# Patient Record
Sex: MALE | Race: WHITE | Employment: FULL TIME | URBAN - METROPOLITAN AREA
[De-identification: names, ages, dates, MRNs, and addresses within clinical notes are randomized per-mention and may not be internally consistent; named-entity substitution may affect disease eponyms.]

---

## 2017-06-12 RX ORDER — LISINOPRIL 10 MG/1
10 TABLET ORAL DAILY
Qty: 90 TABLET | Refills: 2 | Status: SHIPPED | OUTPATIENT
Start: 2017-06-12 | End: 2018-05-03 | Stop reason: SDUPTHER

## 2017-06-12 RX ORDER — ATORVASTATIN CALCIUM 40 MG/1
TABLET, FILM COATED ORAL
Qty: 90 TABLET | Refills: 2 | Status: ON HOLD | OUTPATIENT
Start: 2017-06-12 | End: 2022-04-30 | Stop reason: HOSPADM

## 2017-06-12 RX ORDER — CLOPIDOGREL BISULFATE 75 MG/1
75 TABLET ORAL DAILY
Qty: 90 TABLET | Refills: 2 | Status: SHIPPED | OUTPATIENT
Start: 2017-06-12 | End: 2018-07-09 | Stop reason: SDUPTHER

## 2018-03-20 RX ORDER — LISINOPRIL 10 MG/1
10 TABLET ORAL DAILY
Qty: 90 TABLET | Refills: 2 | OUTPATIENT
Start: 2018-03-20

## 2018-03-20 RX ORDER — ATORVASTATIN CALCIUM 40 MG/1
TABLET, FILM COATED ORAL
Qty: 90 TABLET | Refills: 2 | OUTPATIENT
Start: 2018-03-20

## 2018-03-20 RX ORDER — CLOPIDOGREL BISULFATE 75 MG/1
75 TABLET ORAL DAILY
Qty: 90 TABLET | Refills: 2 | OUTPATIENT
Start: 2018-03-20

## 2018-04-17 ENCOUNTER — TELEPHONE (OUTPATIENT)
Dept: CARDIOLOGY CLINIC | Age: 48
End: 2018-04-17

## 2018-05-03 ENCOUNTER — OFFICE VISIT (OUTPATIENT)
Dept: CARDIOLOGY CLINIC | Age: 48
End: 2018-05-03

## 2018-05-03 VITALS
DIASTOLIC BLOOD PRESSURE: 100 MMHG | HEIGHT: 70 IN | WEIGHT: 315 LBS | HEART RATE: 98 BPM | BODY MASS INDEX: 45.1 KG/M2 | OXYGEN SATURATION: 97 % | SYSTOLIC BLOOD PRESSURE: 138 MMHG

## 2018-05-03 DIAGNOSIS — I25.10 CORONARY ARTERY DISEASE INVOLVING NATIVE CORONARY ARTERY OF NATIVE HEART WITHOUT ANGINA PECTORIS: Primary | ICD-10-CM

## 2018-05-03 DIAGNOSIS — I10 ESSENTIAL HYPERTENSION: ICD-10-CM

## 2018-05-03 DIAGNOSIS — Z95.1 S/P CABG (CORONARY ARTERY BYPASS GRAFT): ICD-10-CM

## 2018-05-03 DIAGNOSIS — Z01.810 PREOP CARDIOVASCULAR EXAM: ICD-10-CM

## 2018-05-03 DIAGNOSIS — E78.00 PURE HYPERCHOLESTEROLEMIA: ICD-10-CM

## 2018-05-03 PROCEDURE — 99215 OFFICE O/P EST HI 40 MIN: CPT | Performed by: INTERNAL MEDICINE

## 2018-05-03 RX ORDER — LISINOPRIL 20 MG/1
20 TABLET ORAL DAILY
Qty: 90 TABLET | Refills: 3 | Status: ON HOLD
Start: 2018-05-03 | End: 2022-04-30 | Stop reason: SDUPTHER

## 2018-05-03 ASSESSMENT — ENCOUNTER SYMPTOMS
COUGH: 0
ABDOMINAL DISTENTION: 0
BLOOD IN STOOL: 0
EYE REDNESS: 0
SHORTNESS OF BREATH: 0
WHEEZING: 0

## 2018-06-02 PROBLEM — Z01.810 PREOP CARDIOVASCULAR EXAM: Status: RESOLVED | Noted: 2018-05-03 | Resolved: 2018-06-02

## 2018-07-09 RX ORDER — CLOPIDOGREL BISULFATE 75 MG/1
75 TABLET ORAL DAILY
Qty: 90 TABLET | Refills: 2 | Status: SHIPPED | OUTPATIENT
Start: 2018-07-09 | End: 2019-04-08 | Stop reason: SDUPTHER

## 2019-04-09 RX ORDER — CLOPIDOGREL BISULFATE 75 MG/1
75 TABLET ORAL DAILY
Qty: 90 TABLET | Refills: 1 | Status: ON HOLD | OUTPATIENT
Start: 2019-04-09 | End: 2022-04-30 | Stop reason: SDUPTHER

## 2022-01-01 ENCOUNTER — HOSPITAL ENCOUNTER (EMERGENCY)
Age: 52
Discharge: HOME | End: 2022-01-02
Attending: STUDENT IN AN ORGANIZED HEALTH CARE EDUCATION/TRAINING PROGRAM
Payer: COMMERCIAL

## 2022-01-01 ENCOUNTER — APPOINTMENT (OUTPATIENT)
Dept: GENERAL RADIOLOGY | Age: 52
End: 2022-01-01
Payer: COMMERCIAL

## 2022-01-01 VITALS
HEIGHT: 70 IN | RESPIRATION RATE: 15 BRPM | TEMPERATURE: 97.4 F | OXYGEN SATURATION: 97 % | WEIGHT: 315 LBS | BODY MASS INDEX: 45.1 KG/M2 | HEART RATE: 77 BPM | DIASTOLIC BLOOD PRESSURE: 96 MMHG | SYSTOLIC BLOOD PRESSURE: 166 MMHG

## 2022-01-01 DIAGNOSIS — J06.9 VIRAL URI: ICD-10-CM

## 2022-01-01 DIAGNOSIS — Z20.822 LAB TEST NEGATIVE FOR COVID-19 VIRUS: Primary | ICD-10-CM

## 2022-01-01 LAB
BASOPHILS ABSOLUTE: 0.1 K/UL (ref 0–0.2)
BASOPHILS RELATIVE PERCENT: 1.9 %
EOSINOPHILS ABSOLUTE: 0.4 K/UL (ref 0–0.6)
EOSINOPHILS RELATIVE PERCENT: 6.5 %
HCT VFR BLD CALC: 45.6 % (ref 40.5–52.5)
HEMOGLOBIN: 15.1 G/DL (ref 13.5–17.5)
INFLUENZA A: NOT DETECTED
INFLUENZA B: NOT DETECTED
LYMPHOCYTES ABSOLUTE: 2 K/UL (ref 1–5.1)
LYMPHOCYTES RELATIVE PERCENT: 31.8 %
MCH RBC QN AUTO: 29.9 PG (ref 26–34)
MCHC RBC AUTO-ENTMCNC: 33.1 G/DL (ref 31–36)
MCV RBC AUTO: 90.3 FL (ref 80–100)
MONOCYTES ABSOLUTE: 0.6 K/UL (ref 0–1.3)
MONOCYTES RELATIVE PERCENT: 8.8 %
NEUTROPHILS ABSOLUTE: 3.3 K/UL (ref 1.7–7.7)
NEUTROPHILS RELATIVE PERCENT: 51 %
PDW BLD-RTO: 14.8 % (ref 12.4–15.4)
PLATELET # BLD: 208 K/UL (ref 135–450)
PMV BLD AUTO: 8.4 FL (ref 5–10.5)
RBC # BLD: 5.05 M/UL (ref 4.2–5.9)
SARS-COV-2 RNA, RT PCR: NOT DETECTED
WBC # BLD: 6.4 K/UL (ref 4–11)

## 2022-01-01 PROCEDURE — 36415 COLL VENOUS BLD VENIPUNCTURE: CPT

## 2022-01-01 PROCEDURE — 85025 COMPLETE CBC W/AUTO DIFF WBC: CPT

## 2022-01-01 PROCEDURE — 87636 SARSCOV2 & INF A&B AMP PRB: CPT

## 2022-01-01 PROCEDURE — 84484 ASSAY OF TROPONIN QUANT: CPT

## 2022-01-01 PROCEDURE — 93005 ELECTROCARDIOGRAM TRACING: CPT | Performed by: STUDENT IN AN ORGANIZED HEALTH CARE EDUCATION/TRAINING PROGRAM

## 2022-01-01 PROCEDURE — 71045 X-RAY EXAM CHEST 1 VIEW: CPT

## 2022-01-01 PROCEDURE — 80053 COMPREHEN METABOLIC PANEL: CPT

## 2022-01-01 PROCEDURE — 99282 EMERGENCY DEPT VISIT SF MDM: CPT

## 2022-01-01 ASSESSMENT — PAIN SCALES - GENERAL: PAINLEVEL_OUTOF10: 2

## 2022-01-01 ASSESSMENT — PAIN DESCRIPTION - LOCATION: LOCATION: GENERALIZED

## 2022-01-01 ASSESSMENT — PAIN DESCRIPTION - DESCRIPTORS: DESCRIPTORS: ACHING

## 2022-01-02 LAB
A/G RATIO: 1.3 (ref 1.1–2.2)
ALBUMIN SERPL-MCNC: 3.9 G/DL (ref 3.4–5)
ALP BLD-CCNC: 147 U/L (ref 40–129)
ALT SERPL-CCNC: 28 U/L (ref 10–40)
ANION GAP SERPL CALCULATED.3IONS-SCNC: 12 MMOL/L (ref 3–16)
AST SERPL-CCNC: 23 U/L (ref 15–37)
BILIRUB SERPL-MCNC: 0.3 MG/DL (ref 0–1)
BUN BLDV-MCNC: 12 MG/DL (ref 7–20)
CALCIUM SERPL-MCNC: 9.1 MG/DL (ref 8.3–10.6)
CHLORIDE BLD-SCNC: 103 MMOL/L (ref 99–110)
CO2: 23 MMOL/L (ref 21–32)
CREAT SERPL-MCNC: 0.8 MG/DL (ref 0.9–1.3)
EKG ATRIAL RATE: 71 BPM
EKG DIAGNOSIS: NORMAL
EKG P AXIS: 25 DEGREES
EKG P-R INTERVAL: 168 MS
EKG Q-T INTERVAL: 392 MS
EKG QRS DURATION: 100 MS
EKG QTC CALCULATION (BAZETT): 425 MS
EKG R AXIS: -51 DEGREES
EKG T AXIS: 98 DEGREES
EKG VENTRICULAR RATE: 71 BPM
GFR AFRICAN AMERICAN: >60
GFR NON-AFRICAN AMERICAN: >60
GLUCOSE BLD-MCNC: 142 MG/DL (ref 70–99)
POTASSIUM REFLEX MAGNESIUM: 4 MMOL/L (ref 3.5–5.1)
SODIUM BLD-SCNC: 138 MMOL/L (ref 136–145)
TOTAL PROTEIN: 6.9 G/DL (ref 6.4–8.2)
TROPONIN: <0.01 NG/ML

## 2022-01-02 PROCEDURE — 93010 ELECTROCARDIOGRAM REPORT: CPT | Performed by: INTERNAL MEDICINE

## 2022-01-02 NOTE — ED PROVIDER NOTES
[address] ED      CHIEF COMPLAINT  Concern For COVID-19 (Patient states that he has had nasal congestion, chills and loss of taste a few days ago)     HISTORY OF PRESENT ILLNESS  Sahara Cantor is a 49 y.o. male  who presents to the ED complaining of nasal and chest congestion, COVID exposure, chills, and burning/achy chest pain. 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 About a week ago he started to develop symptoms as described above which seemed to worsen about 3 days ago. He is concerned to go back to school on Monday as he wants to be sure he will not be potentially exposing any students to [place]. Also states he is frustrated that whenever he goes anywhere only a few people are wearing masks. He did receive 2 doses of Moderna vaccine. He denies measured fevers. He also complains of achy/burning chest pain in the center and left sides of his chest for the past few days which he believes are likely secondary to chest congestion however does state he had a stent in the past and would like to be sure it is not his heart. He denies any shortness of breath. Has a remote history of provoked DVT/PE after surgery and states this does not feel at all similar. He denies leg pain or swelling. He denies pleuritic chest pain. Denies cough or sputum production. Denies other complaints or concerns. No other complaints, modifying factors or associated symptoms. I have reviewed the following from the nursing documentation. Past Medical History:   Diagnosis Date    CAD (coronary artery disease)     stent 2006 & 2008, balloon angioplasty in 2010, S/P NSTEMI 8/19/14 post CABG-->Cath patent grafts, s/p LCx ADELIA.     H/O coronary artery bypass surgery     s/p CABG X2 LIMA-LAD, SVG-OM.  Echo 8/2014 LVEF 50-55%    History of tobacco use     quit smoking     HLD (hyperlipidemia)     Hypertension     Controlled    PE (pulmonary embolism)     and DVT history     Past Surgical History:   Procedure Laterality Date    CORONARY ARTERY BYPASS GRAFT  8-    CABG x 2     Family History   Problem Relation Age of Onset    Heart Disease Father      Social History     Socioeconomic History    Marital status:      Spouse name: Not on file    Number of children: Not on file    Years of education: Not on file    Highest education level: Not on file   Occupational History    Not on file   Tobacco Use    Smoking status: Former Smoker     Packs/day: 1.50     Years: 15.00     Pack years: 22.50     Types: Cigarettes     Quit date: 3/20/2006     Years since quitting: 15.8    Smokeless tobacco: Never Used   Vaping Use    Vaping Use: Never used   Substance and Sexual Activity    Alcohol use: No    Drug use: Not on file    Sexual activity: Not on file   Other Topics Concern    Not on file   Social History Narrative    Not on file     Social Determinants of Health     Financial Resource Strain:     Difficulty of Paying Living Expenses: Not on file   Food Insecurity:     Worried About [employer] in the Last Year: Not on file    Vee of Food in the Last Year: Not on file   Transportation Needs:     Lack of Transportation (Medical): Not on file    Lack of Transportation (Non-Medical):  Not on file   Physical Activity:     Days of Exercise per Week: Not on file    Minutes of Exercise per Session: Not on file   Stress:     Feeling of Stress : Not on file   Social Connections:     Frequency of Communication with Friends and Family: Not on file    Frequency of Social Gatherings with Friends and Family: Not on file    Attends Religion Services: Not on file    Active Member of Clubs or Organizations: Not on file    Attends Club or Organization Meetings: Not on file    Marital Status: Not on file   Intimate Partner Violence:     Fear of Current or Ex-Partner: Not on file    Emotionally Abused: Not on file    Physically Abused: Not on file    Sexually Abused: Not on file   Housing Stability:     Unable to Pay for Housing in the Last Year: Not on file    Number of Places Lived in the Last Year: Not on file    Unstable Housing in the Last Year: Not on file     No current facility-administered medications for this encounter. Current Outpatient Medications   Medication Sig Dispense Refill    clopidogrel (PLAVIX) 75 MG tablet TAKE 1 TABLET BY MOUTH DAILY 90 tablet 1    lisinopril (PRINIVIL;ZESTRIL) 20 MG tablet Take 1 tablet by mouth daily 90 tablet 3    atorvastatin (LIPITOR) 40 MG tablet TAKE 1 TABLET BY MOUTH NIGHTLY. 90 tablet 2    metoprolol tartrate (LOPRESSOR) 25 MG tablet Take 1 tablet by mouth 2 times daily 180 tablet 2    FLUoxetine (PROZAC) 10 MG capsule Take 10 mg by mouth daily      clonazePAM (KLONOPIN) 0.5 MG tablet Take 0.5 mg by mouth 2 times daily      Esomeprazole Magnesium (NEXIUM 24HR PO) Take 1 tablet by mouth daily      aspirin 81 MG chewable tablet Take 1 tablet by mouth daily. 30 tablet 3     No Known Allergies    REVIEW OF SYSTEMS  10 systems reviewed, pertinent positives per HPI otherwise noted to be negative. PHYSICAL EXAM  BP (!) 166/96   Pulse 77   Temp 97.4 °F (36.3 °C) (Temporal)   Resp 15   Ht 5' 10\" (1.778 m)   Wt (!) 320 lb (145.2 kg)   SpO2 97%   BMI 45.92 kg/m²    GENERAL APPEARANCE: Awake and alert. Cooperative. No acute distress. HENT: Normocephalic. Atraumatic. Mucous membranes are moist. No tonsillar swelling or exudates. No posterior pharyngeal erythema. No uvular deviation. NECK: Supple. EYES: PERRL. EOM's grossly intact. HEART/CHEST: RRR. No murmurs. LUNGS: Respirations unlabored. CTAB. Good air exchange. Speaking comfortably in full sentences. ABDOMEN: No tenderness. Soft. Non-distended. No masses. No organomegaly. No guarding or rebound. MUSCULOSKELETAL: No extremity edema. Compartments soft. No deformity. No tenderness in the extremities.   All extremities neurovascularly intact. SKIN: Warm and dry. No acute rashes. NEUROLOGICAL: Alert and oriented. CN's 2-12 intact. No gross facial drooping. Strength 5/5, sensation intact. Gait normal.  PSYCHIATRIC: Normal mood and affect. LABS  I have reviewed all labs for this visit.    Results for orders placed or performed during the hospital encounter of 01/01/22   COVID-19 & Influenza Combo    Specimen: Nasopharyngeal Swab   Result Value Ref Range    SARS-CoV-2 RNA, RT PCR NOT DETECTED NOT DETECTED    INFLUENZA A NOT DETECTED NOT DETECTED    INFLUENZA B NOT DETECTED NOT DETECTED   Comprehensive Metabolic Panel w/ Reflex to MG   Result Value Ref Range    Sodium 138 136 - 145 mmol/L    Potassium reflex Magnesium 4.0 3.5 - 5.1 mmol/L    Chloride 103 99 - 110 mmol/L    CO2 23 21 - 32 mmol/L    Anion Gap 12 3 - 16    Glucose 142 (H) 70 - 99 mg/dL    BUN 12 7 - 20 mg/dL    CREATININE 0.8 (L) 0.9 - 1.3 mg/dL    GFR Non-African American >60 >60    GFR African American >60 >60    Calcium 9.1 8.3 - 10.6 mg/dL    Total Protein 6.9 6.4 - 8.2 g/dL    Albumin 3.9 3.4 - 5.0 g/dL    Albumin/Globulin Ratio 1.3 1.1 - 2.2    Total Bilirubin 0.3 0.0 - 1.0 mg/dL    Alkaline Phosphatase 147 (H) 40 - 129 U/L    ALT 28 10 - 40 U/L    AST 23 15 - 37 U/L   Troponin   Result Value Ref Range    Troponin <0.01 <0.01 ng/mL   CBC Auto Differential   Result Value Ref Range    WBC 6.4 4.0 - 11.0 K/uL    RBC 5.05 4.20 - 5.90 M/uL    Hemoglobin 15.1 13.5 - 17.5 g/dL    Hematocrit 45.6 40.5 - 52.5 %    MCV 90.3 80.0 - 100.0 fL    MCH 29.9 26.0 - 34.0 pg    MCHC 33.1 31.0 - 36.0 g/dL    RDW 14.8 12.4 - 15.4 %    Platelets 709 024 - 226 K/uL    MPV 8.4 5.0 - 10.5 fL    Neutrophils % 51.0 %    Lymphocytes % 31.8 %    Monocytes % 8.8 %    Eosinophils % 6.5 %    Basophils % 1.9 %    Neutrophils Absolute 3.3 1.7 - 7.7 K/uL    Lymphocytes Absolute 2.0 1.0 - 5.1 K/uL    Monocytes Absolute 0.6 0.0 - 1.3 K/uL    Eosinophils Absolute 0.4 0.0 - 0.6 K/uL    Basophils Absolute 0.1 0.0 - 0.2 K/uL       ECG  The Ekg interpreted by me shows  normal sinus rhythm with a rate of 71  Axis is   Left axis deviation  QTc is  normal  Left anterior fascicular block  ST Segments: nonspecific changes  No significant change from prior EKG dated 1/6/2016    RADIOLOGY  XR CHEST PORTABLE   Final Result   No acute process. ED COURSE/MDM  Patient seen and evaluated. Old records reviewed. Labs and imaging reviewed and results discussed with patient. Patient is a 71-year-old male, presenting with concerns for nasal congestion, chest congestion, chest achiness/burning sensation, chills. Full HPI as detailed above. Upon arrival in the ED, vitals reassuring. Patient is resting comfortably is in no acute distress. States that he is primarily here for Covid testing, does have a history of previous coronary artery disease with stent placement and would also like to have his heart checked. Symptoms have been ongoing for the past several days. 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 Covid and influenza test are negative. Chest x-ray negative for any acute process. EKG similar to previous with no evidence of acute ischemia or dysrhythmias. Troponin is negative, given the fact that he has had symptoms for the past several days do not feel that repeat is warranted. Other labs are reassuring. Patient was reassessed, continued to feel well. He was reassured by his testing results. He will be discharged home, given return precautions for the ED and advised follow-up with his cardiologist.  He is comfortable in agreement with plan of care. During the patient's ED course, the patient was given:  Medications - No data to display     CLINICAL IMPRESSION  1. Lab test negative for COVID-19 virus    2.  Viral URI        Blood pressure (!) 166/96, pulse 77, temperature 97.4 °F (36.3 °C),

## 2022-01-15 ENCOUNTER — APPOINTMENT (OUTPATIENT)
Dept: GENERAL RADIOLOGY | Age: 52
End: 2022-01-15
Payer: COMMERCIAL

## 2022-01-15 ENCOUNTER — HOSPITAL ENCOUNTER (EMERGENCY)
Age: 52
Discharge: HOME | End: 2022-01-16
Attending: EMERGENCY MEDICINE
Payer: COMMERCIAL

## 2022-01-15 VITALS
HEIGHT: 70 IN | TEMPERATURE: 98.3 F | SYSTOLIC BLOOD PRESSURE: 177 MMHG | DIASTOLIC BLOOD PRESSURE: 110 MMHG | WEIGHT: 315 LBS | OXYGEN SATURATION: 95 % | HEART RATE: 91 BPM | RESPIRATION RATE: 18 BRPM | BODY MASS INDEX: 45.1 KG/M2

## 2022-01-15 DIAGNOSIS — U07.1 COVID: Primary | ICD-10-CM

## 2022-01-15 LAB
INFLUENZA A: NOT DETECTED
INFLUENZA B: NOT DETECTED
SARS-COV-2 RNA, RT PCR: DETECTED

## 2022-01-15 PROCEDURE — 99284 EMERGENCY DEPT VISIT MOD MDM: CPT

## 2022-01-15 PROCEDURE — 87636 SARSCOV2 & INF A&B AMP PRB: CPT

## 2022-01-15 PROCEDURE — 71046 X-RAY EXAM CHEST 2 VIEWS: CPT

## 2022-01-15 PROCEDURE — 6370000000 HC RX 637 (ALT 250 FOR IP): Performed by: EMERGENCY MEDICINE

## 2022-01-15 RX ORDER — BENZONATATE 100 MG/1
100 CAPSULE ORAL 2 TIMES DAILY PRN
Qty: 20 CAPSULE | Refills: 0 | Status: SHIPPED | OUTPATIENT
Start: 2022-01-15 | End: 2022-01-15 | Stop reason: SDUPTHER

## 2022-01-15 RX ORDER — ALBUTEROL SULFATE 90 UG/1
2 AEROSOL, METERED RESPIRATORY (INHALATION) 4 TIMES DAILY PRN
Qty: 18 G | Refills: 0 | Status: SHIPPED | OUTPATIENT
Start: 2022-01-15

## 2022-01-15 RX ORDER — ALBUTEROL SULFATE 90 UG/1
2 AEROSOL, METERED RESPIRATORY (INHALATION) EVERY 6 HOURS PRN
Status: DISCONTINUED | OUTPATIENT
Start: 2022-01-15 | End: 2022-01-16 | Stop reason: HOSPADM

## 2022-01-15 RX ORDER — BENZONATATE 100 MG/1
100 CAPSULE ORAL 2 TIMES DAILY PRN
Qty: 20 CAPSULE | Refills: 0 | Status: SHIPPED | OUTPATIENT
Start: 2022-01-15 | End: 2022-01-22

## 2022-01-15 RX ORDER — OMEPRAZOLE 40 MG/1
40 CAPSULE, DELAYED RELEASE ORAL DAILY
COMMUNITY

## 2022-01-15 RX ORDER — ALBUTEROL SULFATE 90 UG/1
2 AEROSOL, METERED RESPIRATORY (INHALATION) 4 TIMES DAILY PRN
Qty: 18 G | Refills: 0 | Status: SHIPPED | OUTPATIENT
Start: 2022-01-15 | End: 2022-01-15 | Stop reason: SDUPTHER

## 2022-01-16 ASSESSMENT — ENCOUNTER SYMPTOMS
NAUSEA: 0
BACK PAIN: 0
SHORTNESS OF BREATH: 0
DIARRHEA: 0
RHINORRHEA: 0
ABDOMINAL PAIN: 0
PHOTOPHOBIA: 0
WHEEZING: 0
VOMITING: 0
COUGH: 1

## 2022-01-16 NOTE — ED NOTES
Discharge instructions reviewed with patient. Patient verbalized understanding. All home medications have been reviewed, questions answered and patient voiced understanding. Given prescriptions, discharge instructions, and appointment times.      Aicha Moya RN  01/16/22 0001

## 2022-01-16 NOTE — ED PROVIDER NOTES
Emergency Department Provider Note  Location: [place] [address] ED  1/15/2022     Patient Identification  Radha Mas is a 49 y.o. male    Chief Complaint  Illness (pt c/o bad congestion that is painful, headache, chills, cough)          HPI  (History provided by patient)  Patient is a 81-year-old male history of CAD status post bypass remotely remote history of PE hypertension obesity who presents with cough congestion mild headache myalgias and chills. Symptoms developed over the past 24 hours. Reports he has sinus congestion. Cough is nonproductive. No shortness of breath no chest pain no exertional symptoms. No lightheadedness or loss of consciousness. No known sick contacts. He is not vaccinated for SARS-CoV-2. I have reviewed the following nursing documentation:  Allergies: No Known Allergies       Past medical history:  has a past medical history of CAD (coronary artery disease), H/O coronary artery bypass surgery, History of tobacco use, HLD (hyperlipidemia), Hypertension, and PE (pulmonary embolism). Past surgical history:  has a past surgical history that includes Coronary artery bypass graft (8-); Carpal tunnel release; and hx vascular stent. Home medications:   Prior to Admission medications    Medication Sig Start Date End Date Taking?  Authorizing Provider   omeprazole (PRILOSEC) 40 MG delayed release capsule Take 40 mg by mouth daily   Yes Historical Provider, MD   benzonatate (TESSALON) 100 MG capsule Take 1 capsule by mouth 2 times daily as needed for Cough 1/15/22 1/22/22 Yes Yosvany Guerra MD   albuterol sulfate HFA (VENTOLIN HFA) 108 (90 Base) MCG/ACT inhaler Inhale 2 puffs into the lungs 4 times daily as needed for Wheezing 1/15/22  Yes Yosvany Guerra MD   clopidogrel (PLAVIX) 75 MG tablet TAKE 1 TABLET BY MOUTH DAILY 4/9/19   Terry Edgar MD   lisinopril (PRINIVIL;ZESTRIL) 20 MG tablet Take 1 tablet by mouth daily 5/3/18   Terry Edgar MD atorvastatin (LIPITOR) 40 MG tablet TAKE 1 TABLET BY MOUTH NIGHTLY. 6/12/17   Leonora Hudson MD   metoprolol tartrate (LOPRESSOR) 25 MG tablet Take 1 tablet by mouth 2 times daily 6/12/17   Leonora Hudson MD   aspirin 81 MG chewable tablet Take 1 tablet by mouth daily. 8/21/14   Deng Loredo MD       Social history:  reports that he quit smoking about 15 years ago. His smoking use included cigarettes. He has a 22.50 pack-year smoking history. He has never used smokeless tobacco. He reports that he does not drink alcohol. Family history:    Family History   Problem Relation Age of Onset    Heart Disease Father          ROS  Review of Systems   Constitutional: Positive for chills. Negative for fever. HENT: Positive for congestion. Negative for rhinorrhea. Eyes: Negative for photophobia and visual disturbance. Respiratory: Positive for cough. Negative for shortness of breath and wheezing. Cardiovascular: Negative for chest pain and palpitations. Gastrointestinal: Negative for abdominal pain, diarrhea, nausea and vomiting. Genitourinary: Negative for dysuria and hematuria. Musculoskeletal: Positive for myalgias. Negative for back pain and neck pain. Skin: Negative for rash and wound. Neurological: Negative for syncope and weakness. Psychiatric/Behavioral: Negative for agitation and confusion. Exam  ED Triage Vitals [01/15/22 2237]   BP Temp Temp Source Pulse Resp SpO2 Height Weight   (!) 173/110 98.3 °F (36.8 °C) Oral 93 18 96 % 5' 10\" (1.778 m) (!) 320 lb (145.2 kg)       Physical Exam  Vitals and nursing note reviewed. Constitutional:       General: He is not in acute distress. Appearance: He is well-developed. HENT:      Head: Normocephalic and atraumatic. Nose: Nose normal. No congestion. Eyes:      Extraocular Movements: Extraocular movements intact. Pupils: Pupils are equal, round, and reactive to light.    Cardiovascular:      Rate and Rhythm: Normal rate and regular rhythm. Heart sounds: No murmur heard. Pulmonary:      Comments: Coarse breath sounds with faint expiratory wheezes throughout. No accessory muscle use. Abdominal:      General: There is no distension. Palpations: Abdomen is soft. Tenderness: There is no abdominal tenderness. Musculoskeletal:         General: No tenderness or deformity. Normal range of motion. Cervical back: Normal range of motion and neck supple. Right lower leg: No edema. Left lower leg: No edema. Skin:     General: Skin is warm. Findings: No rash. Neurological:      Mental Status: He is alert and oriented to person, place, and time. Motor: No abnormal muscle tone. Coordination: Coordination normal.   Psychiatric:         Mood and Affect: Mood normal.         Behavior: Behavior normal.           ED Course    ED Medication Orders (From admission, onward)    None            Radiology  XR CHEST (2 VW)    Result Date: 1/15/2022  EXAMINATION: TWO XRAY VIEWS OF THE CHEST 1/15/2022 10:03 pm COMPARISON: January 1, 2022. HISTORY: ORDERING SYSTEM PROVIDED HISTORY: cough TECHNOLOGIST PROVIDED HISTORY: Reason for exam:->cough Reason for Exam: cough, pt c/o congestion today FINDINGS: Upright frontal and lateral view chest radiographs were obtained. The heart is enlarged. The mediastinal contour and pleural spaces are otherwise within normal limits. Sternal fixation wires are present. The lungs are grossly clear. There is no focal consolidation or pneumothorax. The pulmonary vascular pattern is within normal limits. No acute thoracic osseous abnormality. Clear lungs. Cardiomegaly. No acute cardiopulmonary abnormality.          Labs  Results for orders placed or performed during the hospital encounter of 01/15/22   COVID-19 & Influenza Combo    Specimen: Nasopharyngeal Swab; Nose   Result Value Ref Range    SARS-CoV-2 RNA, RT PCR DETECTED (A) NOT DETECTED    INFLUENZA A NOT DETECTED NOT DETECTED    INFLUENZA B NOT DETECTED NOT DETECTED         MDM  Patient seen and evaluated. Relevant records reviewed. 61-year-old male who presents with symptoms consistent with viral URI with cough. He is well-appearing on exam his vitals are notable for elevated blood pressure systolics in the 132C otherwise within normal limits. He has coarse breath sounds with faint wheezing. Chest x-ray showing cardiomegaly otherwise no acute findings. His COVID swab is positive. I did recommend labs given his medical history to further stratify for risk of severe disease however he is declining lab work. I have discussed appropriate quarantine supportive care and return precautions with the patient. He is agreeable to plan expressed understanding of plan. Clinical Impression:  1. COVID          Disposition:  Discharge to home in good condition. Blood pressure (!) 177/110, pulse 91, temperature 98.3 °F (36.8 °C), temperature source Oral, resp. rate 18, height 5' 10\" (1.778 m), weight (!) 320 lb (145.2 kg), SpO2 95 %. Patient was given scripts for the following medications. I counseled patient how to take these medications. Discharge Medication List as of 1/15/2022 11:48 PM      START taking these medications    Details   albuterol sulfate HFA (VENTOLIN HFA) 108 (90 Base) MCG/ACT inhaler Inhale 2 puffs into the lungs 4 times daily as needed for Wheezing, Disp-18 g, R-0Normal      benzonatate (TESSALON) 100 MG capsule Take 1 capsule by mouth 2 times daily as needed for Cough, Disp-20 capsule, R-0Normal             Disposition referral (if applicable):  No follow-up provider specified. Total critical care time is 0 minutes, which excludes separately billable procedures and updating family. Time spent is specifically for management of the presenting complaint and symptoms initially, direct bedside care, reevaluation, review of records, and consultation.   There was a high probability of clinically significant life-threatening deterioration in the patient's condition, which required my urgent intervention. This chart was generated in part by using Dragon Dictation system and may contain errors related to that system including errors in grammar, punctuation, and spelling, as well as words and phrases that may be inappropriate. If there are any questions or concerns please feel free to contact the dictating provider for clarification.      Lalita Lama MD  [address] MD  01/16/22 [id number]

## 2022-01-17 ENCOUNTER — CARE COORDINATION (OUTPATIENT)
Dept: CARE COORDINATION | Age: 52
End: 2022-01-17

## 2022-01-17 NOTE — CARE COORDINATION
Ambulatory Care Coordination ED COVID Follow up Call    Challenges to be reviewed by the provider   Additional needs identified to be addressed with provider: No  none                 Encounter was not routed to provider for escalation. Method of communication with provider: none    Discussed COVID-19 related testing which was: available at this time. Test results were: positive. Patient informed of results, if available? Yes. Current Symptoms: fatigue, cough, no new symptoms, no worsening symptoms and sore throat, sinus congestion    Reviewed New or Changed Meds: yes    Do you have what you need at home?  Durable Medical Equipment ordered at discharge: None   Home Health/Outpatient orders at discharge: none   Was patient discharged with a pulse oximeter? No Discussed and confirmed pulse oximeter discharge instructions and when to notify provider or seek emergency care. Patient education provided: Reviewed appropriate site of care based on symptoms and resources available to patient including: PCP and When to call 911. Follow up appointment recommended: Patient given number for [facility] [phone]. . If no appointment scheduled, scheduling offered: Patient will f/u on his own with finding a PCP  No future appointments. Interventions: Obtained and reviewed discharge summary and/or continuity of care documents  Assessment and support for treatment adherence and medication management-Medications reviewed per ED AVS  Reviewed discharge instructions, medical action plan and red flags with patient who verbalized understanding. No further follow-up call indicated based on severity of symptoms and risk factors. Plan for next call: No further f/u warranted  Provided contact information for future needs. Yuko Rodriguez RN     ACM spoke with patient regarding [facility] ED visit on 1/16 for COVID-19. Patient reports he feels better today.  He is using the albuterol inhaler and Tessalon perles with improvement in sx. He is taking otc mucinex for congestion and cepacol for sore throat which is helping as well. He is drinking gatorade zero and urinating ok. He denies chest pain or increased sob. He does not have a pulse oximeter at home. Quarantine guidelines reviewed. ACM discussed red flag sx and when to return to the ER, pt vu. No further follow up warranted. ACM provided patient with contact number and instructed patient to follow up with additional questions or concerns.

## 2022-01-22 ENCOUNTER — APPOINTMENT (OUTPATIENT)
Dept: CT IMAGING | Age: 52
End: 2022-01-22
Payer: COMMERCIAL

## 2022-01-22 ENCOUNTER — HOSPITAL ENCOUNTER (EMERGENCY)
Age: 52
Discharge: ANOTHER HOSPITAL | End: 2022-01-23
Attending: EMERGENCY MEDICINE
Payer: COMMERCIAL

## 2022-01-22 ENCOUNTER — APPOINTMENT (OUTPATIENT)
Dept: GENERAL RADIOLOGY | Age: 52
End: 2022-01-22
Payer: COMMERCIAL

## 2022-01-22 DIAGNOSIS — I21.4 NON-ST ELEVATED MYOCARDIAL INFARCTION (NON-STEMI) (HCC): Primary | ICD-10-CM

## 2022-01-22 DIAGNOSIS — U07.1 COVID-19: ICD-10-CM

## 2022-01-22 LAB
A/G RATIO: 1.2 (ref 1.1–2.2)
ALBUMIN SERPL-MCNC: 3.8 G/DL (ref 3.4–5)
ALP BLD-CCNC: 138 U/L (ref 40–129)
ALT SERPL-CCNC: 39 U/L (ref 10–40)
ANION GAP SERPL CALCULATED.3IONS-SCNC: 8 MMOL/L (ref 3–16)
APTT: 29.9 SEC (ref 26.2–38.6)
AST SERPL-CCNC: 32 U/L (ref 15–37)
BASOPHILS ABSOLUTE: 0.1 K/UL (ref 0–0.2)
BASOPHILS RELATIVE PERCENT: 1 %
BILIRUB SERPL-MCNC: 0.4 MG/DL (ref 0–1)
BUN BLDV-MCNC: 9 MG/DL (ref 7–20)
CALCIUM SERPL-MCNC: 9.1 MG/DL (ref 8.3–10.6)
CHLORIDE BLD-SCNC: 105 MMOL/L (ref 99–110)
CO2: 27 MMOL/L (ref 21–32)
CREAT SERPL-MCNC: 0.8 MG/DL (ref 0.9–1.3)
EOSINOPHILS ABSOLUTE: 0.3 K/UL (ref 0–0.6)
EOSINOPHILS RELATIVE PERCENT: 5.7 %
GFR AFRICAN AMERICAN: >60
GFR NON-AFRICAN AMERICAN: >60
GLUCOSE BLD-MCNC: 108 MG/DL (ref 70–99)
HCT VFR BLD CALC: 43 % (ref 40.5–52.5)
HEMOGLOBIN: 14.7 G/DL (ref 13.5–17.5)
INR BLD: 1.19 (ref 0.88–1.12)
LACTIC ACID: 1.8 MMOL/L (ref 0.4–2)
LYMPHOCYTES ABSOLUTE: 2 K/UL (ref 1–5.1)
LYMPHOCYTES RELATIVE PERCENT: 33.9 %
MCH RBC QN AUTO: 29.9 PG (ref 26–34)
MCHC RBC AUTO-ENTMCNC: 34.1 G/DL (ref 31–36)
MCV RBC AUTO: 87.6 FL (ref 80–100)
MONOCYTES ABSOLUTE: 0.7 K/UL (ref 0–1.3)
MONOCYTES RELATIVE PERCENT: 11.5 %
NEUTROPHILS ABSOLUTE: 2.8 K/UL (ref 1.7–7.7)
NEUTROPHILS RELATIVE PERCENT: 47.9 %
PDW BLD-RTO: 14.5 % (ref 12.4–15.4)
PLATELET # BLD: 213 K/UL (ref 135–450)
PMV BLD AUTO: 7.8 FL (ref 5–10.5)
POTASSIUM REFLEX MAGNESIUM: 4.3 MMOL/L (ref 3.5–5.1)
PROTHROMBIN TIME: 13.6 SEC (ref 9.9–12.7)
RBC # BLD: 4.91 M/UL (ref 4.2–5.9)
SODIUM BLD-SCNC: 140 MMOL/L (ref 136–145)
TOTAL PROTEIN: 7 G/DL (ref 6.4–8.2)
TROPONIN: 0.02 NG/ML
WBC # BLD: 5.8 K/UL (ref 4–11)

## 2022-01-22 PROCEDURE — 80053 COMPREHEN METABOLIC PANEL: CPT

## 2022-01-22 PROCEDURE — 2580000003 HC RX 258: Performed by: NURSE PRACTITIONER

## 2022-01-22 PROCEDURE — 6360000002 HC RX W HCPCS: Performed by: NURSE PRACTITIONER

## 2022-01-22 PROCEDURE — 85025 COMPLETE CBC W/AUTO DIFF WBC: CPT

## 2022-01-22 PROCEDURE — 96376 TX/PRO/DX INJ SAME DRUG ADON: CPT

## 2022-01-22 PROCEDURE — 71260 CT THORAX DX C+: CPT

## 2022-01-22 PROCEDURE — 84484 ASSAY OF TROPONIN QUANT: CPT

## 2022-01-22 PROCEDURE — 85730 THROMBOPLASTIN TIME PARTIAL: CPT

## 2022-01-22 PROCEDURE — 71045 X-RAY EXAM CHEST 1 VIEW: CPT

## 2022-01-22 PROCEDURE — 96366 THER/PROPH/DIAG IV INF ADDON: CPT

## 2022-01-22 PROCEDURE — 6360000004 HC RX CONTRAST MEDICATION: Performed by: EMERGENCY MEDICINE

## 2022-01-22 PROCEDURE — 93005 ELECTROCARDIOGRAM TRACING: CPT | Performed by: NURSE PRACTITIONER

## 2022-01-22 PROCEDURE — 96365 THER/PROPH/DIAG IV INF INIT: CPT

## 2022-01-22 PROCEDURE — 99285 EMERGENCY DEPT VISIT HI MDM: CPT

## 2022-01-22 PROCEDURE — 85610 PROTHROMBIN TIME: CPT

## 2022-01-22 PROCEDURE — 83605 ASSAY OF LACTIC ACID: CPT

## 2022-01-22 PROCEDURE — 6360000002 HC RX W HCPCS

## 2022-01-22 PROCEDURE — 6370000000 HC RX 637 (ALT 250 FOR IP): Performed by: NURSE PRACTITIONER

## 2022-01-22 RX ORDER — HEPARIN SODIUM 1000 [USP'U]/ML
2000 INJECTION, SOLUTION INTRAVENOUS; SUBCUTANEOUS PRN
Status: DISCONTINUED | OUTPATIENT
Start: 2022-01-22 | End: 2022-01-23 | Stop reason: HOSPADM

## 2022-01-22 RX ORDER — HEPARIN SODIUM 1000 [USP'U]/ML
4000 INJECTION, SOLUTION INTRAVENOUS; SUBCUTANEOUS PRN
Status: DISCONTINUED | OUTPATIENT
Start: 2022-01-22 | End: 2022-01-23 | Stop reason: HOSPADM

## 2022-01-22 RX ORDER — ONDANSETRON 2 MG/ML
INJECTION INTRAMUSCULAR; INTRAVENOUS
Status: COMPLETED
Start: 2022-01-22 | End: 2022-01-22

## 2022-01-22 RX ORDER — NITROGLYCERIN 0.4 MG/1
0.4 TABLET SUBLINGUAL EVERY 5 MIN PRN
Status: DISCONTINUED | OUTPATIENT
Start: 2022-01-22 | End: 2022-01-23 | Stop reason: HOSPADM

## 2022-01-22 RX ORDER — ASPIRIN 325 MG
325 TABLET ORAL ONCE
Status: COMPLETED | OUTPATIENT
Start: 2022-01-22 | End: 2022-01-22

## 2022-01-22 RX ORDER — HEPARIN SODIUM 1000 [USP'U]/ML
4000 INJECTION, SOLUTION INTRAVENOUS; SUBCUTANEOUS ONCE
Status: COMPLETED | OUTPATIENT
Start: 2022-01-22 | End: 2022-01-22

## 2022-01-22 RX ORDER — HEPARIN SODIUM 10000 [USP'U]/100ML
5-30 INJECTION, SOLUTION INTRAVENOUS CONTINUOUS
Status: DISCONTINUED | OUTPATIENT
Start: 2022-01-22 | End: 2022-01-22 | Stop reason: SDUPTHER

## 2022-01-22 RX ADMIN — IOPAMIDOL 100 ML: 755 INJECTION, SOLUTION INTRAVENOUS at 22:44

## 2022-01-22 RX ADMIN — HEPARIN SODIUM 4000 UNITS: 1000 INJECTION, SOLUTION INTRAVENOUS; SUBCUTANEOUS at 23:23

## 2022-01-22 RX ADMIN — HEPARIN SODIUM 1000 UNITS/HR: 10000 INJECTION, SOLUTION INTRAVENOUS; SUBCUTANEOUS at 23:18

## 2022-01-22 RX ADMIN — ONDANSETRON HYDROCHLORIDE 4 MG: 2 INJECTION, SOLUTION INTRAMUSCULAR; INTRAVENOUS at 23:10

## 2022-01-22 RX ADMIN — ASPIRIN 325 MG: 325 TABLET ORAL at 21:59

## 2022-01-22 ASSESSMENT — ENCOUNTER SYMPTOMS
BACK PAIN: 0
SORE THROAT: 0
BLOOD IN STOOL: 0
DIARRHEA: 0
SHORTNESS OF BREATH: 0
COUGH: 0
EYE PAIN: 0
NAUSEA: 0
VOMITING: 0
ABDOMINAL PAIN: 0
RHINORRHEA: 0

## 2022-01-22 ASSESSMENT — PAIN SCALES - GENERAL: PAINLEVEL_OUTOF10: 1

## 2022-01-22 ASSESSMENT — PAIN DESCRIPTION - LOCATION: LOCATION: CHEST

## 2022-01-22 ASSESSMENT — PAIN DESCRIPTION - PAIN TYPE: TYPE: ACUTE PAIN

## 2022-01-22 ASSESSMENT — HEART SCORE: ECG: 0

## 2022-01-23 VITALS
BODY MASS INDEX: 45.1 KG/M2 | HEIGHT: 70 IN | SYSTOLIC BLOOD PRESSURE: 157 MMHG | DIASTOLIC BLOOD PRESSURE: 96 MMHG | TEMPERATURE: 98.1 F | RESPIRATION RATE: 24 BRPM | HEART RATE: 81 BPM | OXYGEN SATURATION: 96 % | WEIGHT: 315 LBS

## 2022-01-23 LAB
EKG ATRIAL RATE: 73 BPM
EKG DIAGNOSIS: NORMAL
EKG P AXIS: 27 DEGREES
EKG P-R INTERVAL: 150 MS
EKG Q-T INTERVAL: 398 MS
EKG QRS DURATION: 108 MS
EKG QTC CALCULATION (BAZETT): 438 MS
EKG R AXIS: -54 DEGREES
EKG T AXIS: 113 DEGREES
EKG VENTRICULAR RATE: 73 BPM
TROPONIN: 0.02 NG/ML

## 2022-01-23 PROCEDURE — 84484 ASSAY OF TROPONIN QUANT: CPT

## 2022-01-23 PROCEDURE — 93010 ELECTROCARDIOGRAM REPORT: CPT | Performed by: INTERNAL MEDICINE

## 2022-01-23 NOTE — ED PROVIDER NOTES
[address] ED  EMERGENCY DEPARTMENT ENCOUNTER        Pt Name: Opal Fleischer  MRN: [id number]  Armstrongfboby 1970  Date of evaluation: 1/22/2022  Provider: NILS Kimbrough CNP  PCP: No primary care provider on file. Note Started: 10:03 PM EST       I have seen and evaluated this patient with my supervising physician [facility] PHYSICAL REHABILITATION, DO. Triage CHIEF COMPLAINT       Chief Complaint   Patient presents with    Nausea     Covid + since last saturday 15th    Chest Pain     started this am          HISTORY OF PRESENT ILLNESS   (Location/Symptom, Timing/Onset, Context/Setting, Quality, Duration, Modifying Factors, Severity)  Note limiting factors. Chief Complaint: Chest Pain     Opal Fleischer is a 49 y.o. male who presents to the emerged department with symptoms of chest pain. Patient began with symptoms approximately this morning or late last night with symptoms of sharp pain patient states that pain comes and goes. He states that he has had similar symptoms like this before and has actually had a small heart attack in the past.  He states that he has had extensive cardiac history including CABG, coronary artery stenting, and a thoracic aortic aneurysm repair. Patient states he is also had PE in the past.  He states that the PE occurred when he was recovering from his CABG. He denies any symptoms of abdominal pain. Does report some mild nausea but no vomiting. States that he last had a cardiac cath in July 2021. Patient states at that time he did receive a stent. He denies any pleuritic pain. States that he has also had some symptoms of intermittent cough and congestion. He states that he is COVID-19 positive. Apparently he has had symptomatic for approximately 8 days. Reports that he came here for his COVID-19 work-up and that was performed on 1-15-22. He states that with his symptoms of COVID-19 he has actually had improved symptoms.   He states that the last 24 to 48 hours she has had symptoms as though he is improving from COVID-19. He states that the congestion and cough has lightened. He also states that the body aches have improved. Nursing Notes were all reviewed and agreed with or any disagreements were addressed in the HPI. REVIEW OF SYSTEMS    (2-9 systems for level 4, 10 or more for level 5)     Review of Systems   Constitutional: Negative for chills, diaphoresis and fever. HENT: Negative for congestion, ear pain, rhinorrhea and sore throat. Eyes: Negative for pain and visual disturbance. Respiratory: Negative for cough and shortness of breath. Cardiovascular: Positive for chest pain. Negative for leg swelling. Gastrointestinal: Negative for abdominal pain, blood in stool, diarrhea, nausea and vomiting. Genitourinary: Negative for difficulty urinating, dysuria, flank pain and frequency. Musculoskeletal: Negative for back pain and neck pain. Skin: Negative for rash and wound. Neurological: Negative for dizziness and light-headedness. PAST MEDICAL HISTORY     Past Medical History:   Diagnosis Date    CAD (coronary artery disease)     stent 2006 & 2008, balloon angioplasty in 2010, S/P NSTEMI 8/19/14 post CABG-->Cath patent grafts, s/p LCx ADELIA.     H/O coronary artery bypass surgery     s/p CABG X2 LIMA-LAD, SVG-OM. Echo 8/2014 LVEF 50-55%    History of tobacco use     quit smoking     HLD (hyperlipidemia)     Hypertension     Controlled    PE (pulmonary embolism)     and DVT history       SURGICAL HISTORY     Past Surgical History:   Procedure Laterality Date    CARPAL TUNNEL RELEASE      CORONARY ARTERY BYPASS GRAFT  8-    CABG x 2    HX VASCULAR STENT         CURRENTMEDICATIONS       Previous Medications    ALBUTEROL SULFATE HFA (VENTOLIN HFA) 108 (90 BASE) MCG/ACT INHALER    Inhale 2 puffs into the lungs 4 times daily as needed for Wheezing    ASPIRIN 81 MG CHEWABLE TABLET    Take 1 tablet by mouth daily. ATORVASTATIN (LIPITOR) 40 MG TABLET    TAKE 1 TABLET BY MOUTH NIGHTLY. BENZONATATE (TESSALON) 100 MG CAPSULE    Take 1 capsule by mouth 2 times daily as needed for Cough    CLOPIDOGREL (PLAVIX) 75 MG TABLET    TAKE 1 TABLET BY MOUTH DAILY    LISINOPRIL (PRINIVIL;ZESTRIL) 20 MG TABLET    Take 1 tablet by mouth daily    METOPROLOL TARTRATE (LOPRESSOR) 25 MG TABLET    Take 1 tablet by mouth 2 times daily    OMEPRAZOLE (PRILOSEC) 40 MG DELAYED RELEASE CAPSULE    Take 40 mg by mouth daily       ALLERGIES     Patient has no known allergies. FAMILYHISTORY       Family History   Problem Relation Age of Onset    Heart Disease Father         SOCIAL HISTORY       Social History     Socioeconomic History    Marital status:      Spouse name: None    Number of children: None    Years of education: None    Highest education level: None   Occupational History    None   Tobacco Use    Smoking status: Former Smoker     Packs/day: 1.50     Years: 15.00     Pack years: 22.50     Types: Cigarettes     Quit date: 3/20/2006     Years since quitting: 15.8    Smokeless tobacco: Never Used   Vaping Use    Vaping Use: Never used   Substance and Sexual Activity    Alcohol use: No    Drug use: None    Sexual activity: None   Other Topics Concern    None   Social History Narrative    None     Social Determinants of Health     Financial Resource Strain:     Difficulty of Paying Living Expenses: Not on file   Food Insecurity:     Worried About Running Out of Food in the Last Year: Not on file    Vee of Food in the Last Year: Not on file   Transportation Needs:     Lack of Transportation (Medical): Not on file    Lack of Transportation (Non-Medical):  Not on file   Physical Activity:     Days of Exercise per Week: Not on file    Minutes of Exercise per Session: Not on file   Stress:     Feeling of Stress : Not on file   Social Connections:     Frequency of Communication with Friends and Family: Not on file  Frequency of Social Gatherings with Friends and Family: Not on file    Attends Cheondoism Services: Not on file    Active Member of Clubs or Organizations: Not on file    Attends Club or Organization Meetings: Not on file    Marital Status: Not on file   Intimate Partner Violence:     Fear of Current or Ex-Partner: Not on file    Emotionally Abused: Not on file    Physically Abused: Not on file    Sexually Abused: Not on file   Housing Stability:     Unable to Pay for Housing in the Last Year: Not on file    Number of [place] in the Last Year: Not on file    Unstable Housing in the Last Year: Not on file       SCREENINGS      Heart Score for chest pain patients  History: Moderately Suspicious  ECG: Normal  Patient Age: > 39 and < 65 years  *Risk factors for Atherosclerotic disease: Hypercholesterolemia,Hypertension,Coronary Artery Disease,Obesity  Risk Factors: > 3 Risk factors or history of atherosclerotic disease*  Troponin: > 1 and < 3X normal limit  Heart Score Total: 5      PHYSICAL EXAM    (up to 7 for level 4, 8 or more for level 5)     ED Triage Vitals [01/22/22 2123]   BP Temp Temp Source Pulse Resp SpO2 Height Weight   (!) 188/104 97.4 °F (36.3 °C) Skin 80 16 96 % 5' 10\" (1.778 m) (!) 320 lb (145.2 kg)       Physical Exam  Vitals and nursing note reviewed. Constitutional:       Appearance: Normal appearance. He is not toxic-appearing or diaphoretic. HENT:      Head: Normocephalic and atraumatic. Nose: Nose normal.   Eyes:      General:         Right eye: No discharge. Left eye: No discharge. Cardiovascular:      Rate and Rhythm: Normal rate and regular rhythm. Pulses: Normal pulses. Heart sounds: No murmur heard. Pulmonary:      Effort: Pulmonary effort is normal. No respiratory distress. Abdominal:      Palpations: Abdomen is soft. Tenderness: There is no abdominal tenderness. There is no guarding.    Musculoskeletal:         General: Normal range of motion. Cervical back: Normal range of motion and neck supple. Skin:     General: Skin is warm and dry. Capillary Refill: Capillary refill takes less than 2 seconds. Neurological:      General: No focal deficit present. Mental Status: He is alert and oriented to person, place, and time. Psychiatric:         Mood and Affect: Mood normal.         Behavior: Behavior normal.         DIAGNOSTIC RESULTS   LABS:    Labs Reviewed   COMPREHENSIVE METABOLIC PANEL W/ REFLEX TO MG FOR LOW K - Abnormal; Notable for the following components:       Result Value    Glucose 108 (*)     CREATININE 0.8 (*)     Alkaline Phosphatase 138 (*)     All other components within normal limits    Narrative:     Performed at:  [facility]) - [address] [address],  [place], [facility]   Phone [phone]   TROPONIN - Abnormal; Notable for the following components:    Troponin 0.02 (*)     All other components within normal limits    Narrative:     Performed at:  [address] [address],  [place], [facility]   Phone [phone]   CBC WITH AUTO DIFFERENTIAL    Narrative:     Performed at:  [address] [address],  [place], [facility]   Phone [phone]   LACTIC ACID, PLASMA    Narrative:     Performed at:  [facility]) - [address] [address],  [place], [facility]   Phone [phone]   PROTIME-INR   APTT   APTT   APTT   APTT       When ordered, only abnormal lab results are displayed. All other labs were within normal range or not returned as of this dictation. EKG: When ordered, EKG's are interpreted by the Emergency Department Physician in the absence of a cardiologist.  Please see their note for interpretation of EKG.       RADIOLOGY:   Non-plain film images such as CT, Ultrasound and MRI are read by the radiologist. Plain radiographic images are visualized andpreliminarily interpreted by the  ED Provider with the below findings:        Interpretation [facility] [place] Radiologist below, if available at the time of this note:    XR CHEST PORTABLE   Final Result   Faint bibasilar airspace opacities which may be related to atelectasis or   pneumonia. CT CHEST PULMONARY EMBOLISM W CONTRAST    (Results Pending)     No results found. PROCEDURES   Unless otherwise noted below, none     Procedures    CRITICAL CARE TIME   N/A    CONSULTS:  None      EMERGENCY DEPARTMENT COURSE and DIFFERENTIAL DIAGNOSIS/MDM:   Vitals:    Vitals:    01/22/22 2123   BP: (!) 188/104   Pulse: 80   Resp: 16   Temp: 97.4 °F (36.3 °C)   TempSrc: Skin   SpO2: 96%   Weight: (!) 320 lb (145.2 kg)   Height: 5' 10\" (1.778 m)       Patient was given thefollowing medications:  Medications   nitroGLYCERIN (NITROSTAT) SL tablet 0.4 mg (has no administration in time range)   iopamidol (ISOVUE-370) 76 % injection 100 mL (has no administration in time range)   heparin (porcine) injection 8,710 Units (has no administration in time range)   heparin (porcine) injection 8,710 Units (has no administration in time range)   heparin (porcine) injection 4,360 Units (has no administration in time range)   heparin 25,000 units in dextrose 5% 250 mL (premix) infusion (has no administration in time range)   aspirin tablet 325 mg (325 mg Oral Given 1/22/22 2159)           Patient is currently chest pain-free. He is noted to have a non-ST elevated MI. Due to the patient's recent cardiac work-up at [facility] I believe that due to the continuity of care will be helpful for the patient to be transferred to [facility]. Patient does agree with the treatment plan and transfer. At this time patient is chest pain-free. I did speak with a Dr. Yair Robison who is the cardiologist on-call and agrees with the treatment plan, work-up, and transfer for the patient.   Spoke with the hospitalist at [facility] and they have agreed to accept the patient. I spoke with NILS Huber who agrees to accept the patient. FINAL IMPRESSION      1. Non-ST elevated myocardial infarction (non-STEMI) ([facility] [address]    2. COVID-19          DISPOSITION/PLAN   DISPOSITION Decision To Transfer 01/22/2022 10:30:47 PM      PATIENT REFERREDTO:  No follow-up provider specified.     DISCHARGE MEDICATIONS:  New Prescriptions    No medications on file       DISCONTINUED MEDICATIONS:  Discontinued Medications    No medications on file              (Please note that portions ofthis note were completed with a voice recognition program.  Efforts were made to edit the dictations but occasionally words are mis-transcribed.)    NILS Elizalde CNP (electronically signed)            NILS Elizalde CNP  01/26/22 3111

## 2022-01-23 NOTE — ED PROVIDER NOTES
Patient was complaining of nausea, chest pain. Known COVID-positive. I was asked to evaluate this patient's EKG only, I did not see or evaluate this patient. The Ekg interpreted by me shows  normal sinus rhythm with a rate of 73  Axis is   Left axis deviation  QTc is  normal  Intervals and Durations are unremarkable.       ST Segments: no acute change    No significant change from prior EKG dated/1/2022            Jonathan Lehman,   01/22/22 213

## 2022-01-23 NOTE — ED PROVIDER NOTES
I independently interviewed, examined and evaluated Radha Mas. In brief, this is a 52-year-old male with a past medical history of coronary artery disease presenting with chest pain. The patient has an extensive cardiac history with 4 or 5 stents in place he states. He states that his prior medical care has been at other facilities including [facility]. He states that he has had a twinge of sharp pain in his left upper chest that started last night, and with his cardiac history he wanted to get checked out. He describes the pain as a 1 out of 10. Denies radiation to his arms. He recently was diagnosed with COVID. On exam, he is nontoxic-appearing. Heart is regular rate and rhythm and lungs are clear to auscultation diffusely. Distal pulses are intact. The Ekg interpreted by me shows  normal sinus rhythm with a rate of 73  Axis is   Left axis deviation  QTc is  within an acceptable range  Intervals and Durations are unremarkable. ST Segments: nonspecific ST abnormality no acute ischemia   No significant change from prior EKG dated 1/1/22      ED course: The patient is a 52-year-old male presenting with chest pain and in the setting of history of coronary artery disease. He is hypertensive on arrival, otherwise vital signs within normal limits. He is in no acute distress, was given aspirin 325. He is EKG shows some nonspecific abnormalities in the lateral leads however these are unchanged from prior. His troponin is elevated at 0.02, therefore heparin is going to be initiated. CT is ordered due to the fact that the patient has a history of PE as well as aortic aneurysm repair. CT is negative for acute process other than ground glass opacities consistent with recent COVID infection. Otherwise his lab work is unremarkable. We will initially attempt to transfer him back to [facility] since he had a recent stent placed there in July.  [facility] was [facility] to [facility] and

## 2022-01-23 NOTE — PROGRESS NOTES
Per Low-dose heparin drip protocol:    Wt = 145.2 kg  IBW = 73 kg    Heparin to be dosed on adjusted body wt = 101.9 kg    Baseline aPTT = 29.9 sec  Bolus dose = 4000 units  (60 units/kg with 4000 unit max)    Initiate drip at 12 units/kg/h = 1000 units/h (10 ml/h) - max initial rate of 1000 units/h    First anti-Xa ordered for 1/23 @ 0730    Adjust drip as needed per the following protocol:    Pharmacy to manage Heparin - contact for questions. aPTT < 45    Heparin 60 units/kg bolus  Increase infusion by 4 units/kg/hr = 4.1 ml/h  aPTT 45 - 59.9 Heparin 30 units/kg bolus Increase infusion by 2 units/kg/hr = 2 ml/h  aPTT 60 - 90    No bolus No change   aPTT 90.1 - 97.5 No bolus Decrease infusion by 1 units/kg/hr = 1 ml/h  aPTT 97.6 - 105 No bolus Decrease infusion by 2 units/kg/hr = 2 ml/h  aPTT > 105    Hold heparin for 1 hour Decrease infusion by 3 units/kg/hr = 3.1 ml/h    Obtain anti-Xa 6-8 hours after bolus and 6 hours after any dose change until two consecutive therapeutic aPTT are achieved- then daily.

## 2022-04-13 ENCOUNTER — APPOINTMENT (OUTPATIENT)
Dept: GENERAL RADIOLOGY | Age: 52
End: 2022-04-13
Payer: COMMERCIAL

## 2022-04-13 ENCOUNTER — HOSPITAL ENCOUNTER (EMERGENCY)
Age: 52
Discharge: HOME | End: 2022-04-13
Payer: COMMERCIAL

## 2022-04-13 VITALS
HEIGHT: 70 IN | OXYGEN SATURATION: 99 % | WEIGHT: 315 LBS | TEMPERATURE: 98 F | RESPIRATION RATE: 18 BRPM | SYSTOLIC BLOOD PRESSURE: 156 MMHG | HEART RATE: 64 BPM | DIASTOLIC BLOOD PRESSURE: 97 MMHG | BODY MASS INDEX: 45.1 KG/M2

## 2022-04-13 DIAGNOSIS — J06.9 UPPER RESPIRATORY TRACT INFECTION, UNSPECIFIED TYPE: Primary | ICD-10-CM

## 2022-04-13 LAB
A/G RATIO: 1.5 (ref 1.1–2.2)
ALBUMIN SERPL-MCNC: 4.1 G/DL (ref 3.4–5)
ALP BLD-CCNC: 135 U/L (ref 40–129)
ALT SERPL-CCNC: 29 U/L (ref 10–40)
ANION GAP SERPL CALCULATED.3IONS-SCNC: 14 MMOL/L (ref 3–16)
AST SERPL-CCNC: 24 U/L (ref 15–37)
BACTERIA: ABNORMAL /HPF
BASOPHILS ABSOLUTE: 0.1 K/UL (ref 0–0.2)
BASOPHILS RELATIVE PERCENT: 0.9 %
BILIRUB SERPL-MCNC: 0.5 MG/DL (ref 0–1)
BILIRUBIN URINE: NEGATIVE
BLOOD, URINE: ABNORMAL
BUN BLDV-MCNC: 13 MG/DL (ref 7–20)
CALCIUM SERPL-MCNC: 9.3 MG/DL (ref 8.3–10.6)
CHLORIDE BLD-SCNC: 98 MMOL/L (ref 99–110)
CLARITY: CLEAR
CO2: 24 MMOL/L (ref 21–32)
COLOR: YELLOW
CREAT SERPL-MCNC: 0.8 MG/DL (ref 0.9–1.3)
EKG ATRIAL RATE: 65 BPM
EKG ATRIAL RATE: 75 BPM
EKG DIAGNOSIS: NORMAL
EKG DIAGNOSIS: NORMAL
EKG P AXIS: 19 DEGREES
EKG P AXIS: 33 DEGREES
EKG P-R INTERVAL: 186 MS
EKG P-R INTERVAL: 194 MS
EKG Q-T INTERVAL: 384 MS
EKG Q-T INTERVAL: 404 MS
EKG QRS DURATION: 102 MS
EKG QRS DURATION: 106 MS
EKG QTC CALCULATION (BAZETT): 420 MS
EKG QTC CALCULATION (BAZETT): 428 MS
EKG R AXIS: -56 DEGREES
EKG R AXIS: -58 DEGREES
EKG T AXIS: 120 DEGREES
EKG T AXIS: 120 DEGREES
EKG VENTRICULAR RATE: 65 BPM
EKG VENTRICULAR RATE: 75 BPM
EOSINOPHILS ABSOLUTE: 0.2 K/UL (ref 0–0.6)
EOSINOPHILS RELATIVE PERCENT: 3 %
EPITHELIAL CELLS, UA: ABNORMAL /HPF (ref 0–5)
GFR AFRICAN AMERICAN: >60
GFR NON-AFRICAN AMERICAN: >60
GLUCOSE BLD-MCNC: 126 MG/DL (ref 70–99)
GLUCOSE URINE: NEGATIVE MG/DL
HCT VFR BLD CALC: 43.7 % (ref 40.5–52.5)
HEMOGLOBIN: 15 G/DL (ref 13.5–17.5)
INFLUENZA A: NOT DETECTED
INFLUENZA B: NOT DETECTED
KETONES, URINE: NEGATIVE MG/DL
LEUKOCYTE ESTERASE, URINE: NEGATIVE
LYMPHOCYTES ABSOLUTE: 1.5 K/UL (ref 1–5.1)
LYMPHOCYTES RELATIVE PERCENT: 19.9 %
MCH RBC QN AUTO: 30.4 PG (ref 26–34)
MCHC RBC AUTO-ENTMCNC: 34.2 G/DL (ref 31–36)
MCV RBC AUTO: 88.9 FL (ref 80–100)
MICROSCOPIC EXAMINATION: YES
MONOCYTES ABSOLUTE: 0.8 K/UL (ref 0–1.3)
MONOCYTES RELATIVE PERCENT: 10.1 %
MUCUS: ABNORMAL /LPF
NEUTROPHILS ABSOLUTE: 5 K/UL (ref 1.7–7.7)
NEUTROPHILS RELATIVE PERCENT: 66.1 %
NITRITE, URINE: NEGATIVE
PDW BLD-RTO: 14.2 % (ref 12.4–15.4)
PH UA: 6 (ref 5–8)
PLATELET # BLD: 221 K/UL (ref 135–450)
PMV BLD AUTO: 8.1 FL (ref 5–10.5)
POTASSIUM REFLEX MAGNESIUM: 4.5 MMOL/L (ref 3.5–5.1)
PRO-BNP: 66 PG/ML (ref 0–124)
PROCALCITONIN: 0.06 NG/ML (ref 0–0.15)
PROTEIN UA: NEGATIVE MG/DL
RBC # BLD: 4.92 M/UL (ref 4.2–5.9)
RBC UA: ABNORMAL /HPF (ref 0–4)
SARS-COV-2 RNA, RT PCR: NOT DETECTED
SODIUM BLD-SCNC: 136 MMOL/L (ref 136–145)
SPECIFIC GRAVITY UA: 1.02 (ref 1–1.03)
TOTAL PROTEIN: 6.9 G/DL (ref 6.4–8.2)
TROPONIN: <0.01 NG/ML
URINE REFLEX TO CULTURE: ABNORMAL
URINE TYPE: ABNORMAL
UROBILINOGEN, URINE: 0.2 E.U./DL
WBC # BLD: 7.6 K/UL (ref 4–11)
WBC UA: ABNORMAL /HPF (ref 0–5)

## 2022-04-13 PROCEDURE — 80053 COMPREHEN METABOLIC PANEL: CPT

## 2022-04-13 PROCEDURE — 93010 ELECTROCARDIOGRAM REPORT: CPT | Performed by: INTERNAL MEDICINE

## 2022-04-13 PROCEDURE — 83880 ASSAY OF NATRIURETIC PEPTIDE: CPT

## 2022-04-13 PROCEDURE — 93005 ELECTROCARDIOGRAM TRACING: CPT | Performed by: STUDENT IN AN ORGANIZED HEALTH CARE EDUCATION/TRAINING PROGRAM

## 2022-04-13 PROCEDURE — 99285 EMERGENCY DEPT VISIT HI MDM: CPT

## 2022-04-13 PROCEDURE — 84145 PROCALCITONIN (PCT): CPT

## 2022-04-13 PROCEDURE — 81001 URINALYSIS AUTO W/SCOPE: CPT

## 2022-04-13 PROCEDURE — 71045 X-RAY EXAM CHEST 1 VIEW: CPT

## 2022-04-13 PROCEDURE — 84484 ASSAY OF TROPONIN QUANT: CPT

## 2022-04-13 PROCEDURE — 6370000000 HC RX 637 (ALT 250 FOR IP): Performed by: PHYSICIAN ASSISTANT

## 2022-04-13 PROCEDURE — 87636 SARSCOV2 & INF A&B AMP PRB: CPT

## 2022-04-13 PROCEDURE — 85025 COMPLETE CBC W/AUTO DIFF WBC: CPT

## 2022-04-13 PROCEDURE — 93005 ELECTROCARDIOGRAM TRACING: CPT | Performed by: PHYSICIAN ASSISTANT

## 2022-04-13 RX ORDER — AZITHROMYCIN 250 MG/1
500 TABLET, FILM COATED ORAL ONCE
Status: COMPLETED | OUTPATIENT
Start: 2022-04-13 | End: 2022-04-13

## 2022-04-13 RX ORDER — AZITHROMYCIN 250 MG/1
250 TABLET, FILM COATED ORAL SEE ADMIN INSTRUCTIONS
Qty: 6 TABLET | Refills: 0 | Status: SHIPPED | OUTPATIENT
Start: 2022-04-13 | End: 2022-04-18

## 2022-04-13 RX ORDER — ALBUTEROL SULFATE 90 UG/1
AEROSOL, METERED RESPIRATORY (INHALATION)
Qty: 18 G | Refills: 1 | Status: SHIPPED | OUTPATIENT
Start: 2022-04-13 | End: 2022-04-13 | Stop reason: CLARIF

## 2022-04-13 RX ORDER — ALBUTEROL SULFATE 90 UG/1
AEROSOL, METERED RESPIRATORY (INHALATION)
Qty: 18 G | Refills: 1 | Status: ON HOLD
Start: 2022-04-13 | End: 2022-04-30 | Stop reason: HOSPADM

## 2022-04-13 RX ORDER — AZITHROMYCIN 250 MG/1
250 TABLET, FILM COATED ORAL SEE ADMIN INSTRUCTIONS
Qty: 6 TABLET | Refills: 0 | Status: SHIPPED | OUTPATIENT
Start: 2022-04-13 | End: 2022-04-13 | Stop reason: CLARIF

## 2022-04-13 RX ORDER — ACETAMINOPHEN 500 MG
1000 TABLET ORAL ONCE
Status: COMPLETED | OUTPATIENT
Start: 2022-04-13 | End: 2022-04-13

## 2022-04-13 RX ADMIN — AZITHROMYCIN 500 MG: 250 TABLET, FILM COATED ORAL at 12:38

## 2022-04-13 RX ADMIN — ACETAMINOPHEN 1000 MG: 500 TABLET ORAL at 10:19

## 2022-04-13 ASSESSMENT — PAIN SCALES - GENERAL
PAINLEVEL_OUTOF10: 6
PAINLEVEL_OUTOF10: 3

## 2022-04-13 ASSESSMENT — PAIN DESCRIPTION - LOCATION: LOCATION: HEAD

## 2022-04-13 ASSESSMENT — PAIN DESCRIPTION - PAIN TYPE: TYPE: ACUTE PAIN

## 2022-04-13 NOTE — Clinical Note
Edita Felix was seen and treated in our emergency department on 4/13/2022. He may return to work on 04/15/2022. If you have any questions or concerns, please don't hesitate to call.       Rekha Kaiser PA-C

## 2022-04-13 NOTE — Clinical Note
Erika Pressley was seen and treated in our emergency department on 4/13/2022. He may return to work on 04/15/2022. If you have any questions or concerns, please don't hesitate to call.       Pilo Holt PA-C

## 2022-04-13 NOTE — ED PROVIDER NOTES
[address] [facility]  EMERGENCY DEPARTMENT ENCOUNTER        Pt Name: Sha Martel  MRN: [id number]  [place] 1970  Date of evaluation: 4/13/2022  Provider: José Miguel Cunha PA-C  PCP: No primary care provider on file. Note Started: 9:52 AM EDT       TEJAL. I have evaluated this patient. My supervising physician was available for consultation. CHIEF COMPLAINT       Chief Complaint   Patient presents with    Other     feels achy flu like symptoms       HISTORY OF PRESENT ILLNESS   (Location, Timing/Onset, Context/Setting, Quality, Duration, Modifying Factors, Severity, Associated Signs and Symptoms)  Note limiting factors. Chief Complaint: generalized body aches     Sha Martel is a 49 y.o. male with past medical history of CAD with a previous CABG, hypertension, history of PE DVT on Plavix no other anticoagulation thrombocytopenia hyperlipidemia obesity former smoker quit over 10 years ago brought in today with complaints of his body aches, fevers and chills. States he was at work this morning and all of a sudden he felt that he felt \"sick\". States he had body aches fevers and chills. Onset of symptoms started this morning. Duration symptoms have been persistent since onset. He denies any chest pain or shortness of breath. Denies any nausea vomiting diarrhea or abdominal pain. Reports he does have some coworkers who are out sick. He has not taken anything at this time. No aggravating symptoms. No alleviating symptoms. He states since arriving to the ED he is feeling much better and no longer is complaining of symptoms. Nothing seems to make symptoms better or worse. Nursing Notes were all reviewed and agreed with or any disagreements were addressed in the HPI. REVIEW OF SYSTEMS    (2-9 systems for level 4, 10 or more for level 5)     Review of Systems    Positives and Pertinent negatives as per HPI.  Except as noted above in the ROS, all other systems were reviewed and negative. PAST MEDICAL HISTORY     Past Medical History:   Diagnosis Date    CAD (coronary artery disease)     stent  & , balloon angioplasty in , S/P NSTEMI 14 post CABG-->Cath patent grafts, s/p LCx ADELIA.     H/O coronary artery bypass surgery     s/p CABG X2 LIMA-LAD, SVG-OM. Echo 2014 LVEF 50-55%    History of tobacco use     quit smoking     HLD (hyperlipidemia)     Hypertension     Controlled    PE (pulmonary embolism)     and DVT history         SURGICAL HISTORY     Past Surgical History:   Procedure Laterality Date    CARPAL TUNNEL RELEASE      CORONARY ARTERY BYPASS GRAFT  2014    CABG x 2    HX VASCULAR STENT           CURRENTMEDICATIONS       Current Discharge Medication List      CONTINUE these medications which have NOT CHANGED    Details   omeprazole (PRILOSEC) 40 MG delayed release capsule Take 40 mg by mouth daily      albuterol sulfate HFA (VENTOLIN HFA) 108 (90 Base) MCG/ACT inhaler Inhale 2 puffs into the lungs 4 times daily as needed for Wheezing  Qty: 18 g, Refills: 0      clopidogrel (PLAVIX) 75 MG tablet TAKE 1 TABLET BY MOUTH DAILY  Qty: 90 tablet, Refills: 1      lisinopril (PRINIVIL;ZESTRIL) 20 MG tablet Take 1 tablet by mouth daily  Qty: 90 tablet, Refills: 3      atorvastatin (LIPITOR) 40 MG tablet TAKE 1 TABLET BY MOUTH NIGHTLY. Qty: 90 tablet, Refills: 2      metoprolol tartrate (LOPRESSOR) 25 MG tablet Take 1 tablet by mouth 2 times daily  Qty: 180 tablet, Refills: 2      aspirin 81 MG chewable tablet Take 1 tablet by mouth daily. Qty: 30 tablet, Refills: 3               ALLERGIES     Patient has no known allergies.     FAMILYHISTORY       Family History   Problem Relation Age of Onset    Heart Disease Father           SOCIAL HISTORY       Social History     Tobacco Use    Smoking status: Former Smoker     Packs/day: 1.50     Years: 15.00     Pack years: 22.50     Types: Cigarettes     Quit date: 3/20/2006     Years since quittin.0    Smokeless tobacco: Never Used   Vaping Use    Vaping Use: Never used   Substance Use Topics    Alcohol use: No    Drug use: Not on file       SCREENINGS    [place] Coma Scale  Eye Opening: Spontaneous  Best Verbal Response: Oriented  Best Motor Response: Obeys commands  [place]a Coma Scale Score: 15        PHYSICAL EXAM    (up to 7 for level 4, 8 or more for level 5)     ED Triage Vitals [04/13/22 0922]   BP Temp Temp Source Pulse Resp SpO2 Height Weight   (!) 168/101 98 °F (36.7 °C) Oral 77 16 96 % 5' 10\" (1.778 m) (!) 320 lb (145.2 kg)       Physical Exam  Vitals and nursing note reviewed. Constitutional:       General: He is awake. He is not in acute distress. Appearance: Normal appearance. He is well-developed. He is obese. He is not ill-appearing, toxic-appearing or diaphoretic. HENT:      Head: Normocephalic and atraumatic. Nose: Nose normal.   Eyes:      General:         Right eye: No discharge. Left eye: No discharge. Cardiovascular:      Rate and Rhythm: Normal rate and regular rhythm. Pulses:           Radial pulses are 2+ on the right side and 2+ on the left side. Heart sounds: Normal heart sounds. No murmur heard. No gallop. Pulmonary:      Effort: Pulmonary effort is normal. No respiratory distress. Breath sounds: Normal breath sounds. No decreased breath sounds, wheezing, rhonchi or rales. Chest:      Chest wall: No tenderness. Musculoskeletal:         General: No deformity. Normal range of motion. Cervical back: Normal range of motion and neck supple. Right lower leg: No edema. Left lower leg: No edema. Skin:     General: Skin is warm and dry. Neurological:      General: No focal deficit present. Mental Status: He is alert and oriented to person, place, and time. GCS: GCS eye subscore is 4. GCS verbal subscore is 5. GCS motor subscore is 6. Cranial Nerves: Cranial nerves are intact.    Psychiatric:         Behavior: Behavior normal. Behavior is cooperative. DIAGNOSTIC RESULTS   LABS:    Labs Reviewed   COMPREHENSIVE METABOLIC PANEL W/ REFLEX TO MG FOR LOW K - Abnormal; Notable for the following components:       Result Value    Chloride 98 (*)     Glucose 126 (*)     CREATININE 0.8 (*)     Alkaline Phosphatase 135 (*)     All other components within normal limits   URINALYSIS WITH REFLEX TO CULTURE - Abnormal; Notable for the following components:    Blood, Urine SMALL (*)     All other components within normal limits   MICROSCOPIC URINALYSIS - Abnormal; Notable for the following components:    Mucus, UA 1+ (*)     RBC, UA 5-10 (*)     Bacteria, UA Rare (*)     All other components within normal limits   COVID-19 & INFLUENZA COMBO   CBC WITH AUTO DIFFERENTIAL   BRAIN NATRIURETIC PEPTIDE   PROCALCITONIN   TROPONIN       When ordered only abnormal lab results are displayed. All other labs were within normal range or not returned as of this dictation. EKG: When ordered, EKG's are interpreted by the Emergency Department Physician in the absence of a cardiologist.  Please see their note for interpretation of EKG. RADIOLOGY:   Non-plain film images such as CT, Ultrasound and MRI are read by the radiologist. Plain radiographic images are visualized and preliminarily interpreted by the ED Provider with the below findings:        Interpretation per the Radiologist below, if available at the time of this note:    XR CHEST PORTABLE   Preliminary Result   1. Hazy opacity within the right lung base, representing either atelectasis   or pneumonia. 2. Stable elevation of the right hemidiaphragm. 3. Stable cardiomegaly. XR CHEST PORTABLE    Result Date: 4/13/2022  1. Hazy opacity within the right lung base, representing either atelectasis or pneumonia. 2. Stable elevation of the right hemidiaphragm. 3. Stable cardiomegaly.            PROCEDURES   Unless otherwise noted below, none     Procedures    CRITICAL CARE TIME CONSULTS:  None      EMERGENCY DEPARTMENT COURSE and DIFFERENTIAL DIAGNOSIS/MDM:   Vitals:    Vitals:    04/13/22 1030 04/13/22 1100 04/13/22 1130 04/13/22 1200   BP: (!) 146/87 (!) 158/93 (!) 141/105 (!) 156/97   Pulse: 64 68 62 64   Resp: 21 17 15 18   Temp:       TempSrc:       SpO2: 96% 98% 97% 99%   Weight:       Height:           Patient was given the following medications:  Medications   azithromycin (ZITHROMAX) tablet 500 mg (has no administration in time range)   acetaminophen (TYLENOL) tablet 1,000 mg (1,000 mg Oral Given 4/13/22 1019)           Patient brought in today by private vehicle with complaints of generalized body aches fevers and chills. On exam he is alert oriented afebrile breathing on room air satting at 96%. Nontoxic in appearance. No acute respiratory distress. Old labs and records reviewed. Patient Seen by myself and my attending was available as needed for consultation. CBC shows no white count. Hemoglobin of 15. Urine is negative for infection. EKG reviewed by my attending see note. Chest x-ray shows hazy opacity within the right lung base, representing either atelectasis or pneumonia. Stable elevation of right hemidiaphragm stable cardiomegaly. Troponin less than 0.01. EKG was reviewed by my attending please see note. Procalcitonin is negative. BNP unremarkable. Covid and flu are negative. Given that the patient was symptomatic and had generalized body aches in chest x-ray showing possible opacity versus atelectasis will cover with Zithromax. Plan at this time will be to discharge home with azithromycin and inhaler. He was told to call his PCP to schedule follow-up appointment. Patient told return immediately to the ED if he develops any new or worsening symptoms including but not limited to chest pain shortness of breath or any new or changing symptoms. He verbalized understanding. Patient was discharged in stable condition.     FINAL IMPRESSION      1. Upper respiratory tract infection, unspecified type          DISPOSITION/PLAN   DISPOSITION Discharge - Pending Orders Complete 04/13/2022 12:26:19 PM      PAT[facility]:  [place] [facility]  [address] [place] [address]  Schedule an appointment as soon as possible for a visit   As needed, If symptoms worsen      DISCHARGE MEDICATIONS:  Current Discharge Medication List          DISCONTINUED MEDICATIONS:  Current Discharge Medication List                 (Please note that portions of this note were completed with a voice recognition program.  Efforts were made to edit the dictations but occasionally words are mis-transcribed.)    Nanette Rivera PA-C (electronically signed)            Nanette Rivera PA-C  04/13/22 [id number]

## 2022-04-26 ENCOUNTER — HOSPITAL ENCOUNTER (EMERGENCY)
Age: 52
Discharge: HOME | End: 2022-04-26
Attending: STUDENT IN AN ORGANIZED HEALTH CARE EDUCATION/TRAINING PROGRAM
Payer: COMMERCIAL

## 2022-04-26 ENCOUNTER — APPOINTMENT (OUTPATIENT)
Dept: GENERAL RADIOLOGY | Age: 52
End: 2022-04-26
Payer: COMMERCIAL

## 2022-04-26 VITALS
TEMPERATURE: 97.7 F | WEIGHT: 315 LBS | OXYGEN SATURATION: 100 % | RESPIRATION RATE: 18 BRPM | SYSTOLIC BLOOD PRESSURE: 170 MMHG | HEIGHT: 70 IN | HEART RATE: 89 BPM | BODY MASS INDEX: 45.1 KG/M2 | DIASTOLIC BLOOD PRESSURE: 68 MMHG

## 2022-04-26 DIAGNOSIS — S29.019A ACUTE THORACIC MYOFASCIAL STRAIN, INITIAL ENCOUNTER: Primary | ICD-10-CM

## 2022-04-26 PROCEDURE — 93005 ELECTROCARDIOGRAM TRACING: CPT | Performed by: STUDENT IN AN ORGANIZED HEALTH CARE EDUCATION/TRAINING PROGRAM

## 2022-04-26 PROCEDURE — 6360000002 HC RX W HCPCS: Performed by: STUDENT IN AN ORGANIZED HEALTH CARE EDUCATION/TRAINING PROGRAM

## 2022-04-26 PROCEDURE — 96372 THER/PROPH/DIAG INJ SC/IM: CPT

## 2022-04-26 PROCEDURE — 99284 EMERGENCY DEPT VISIT MOD MDM: CPT

## 2022-04-26 PROCEDURE — 71046 X-RAY EXAM CHEST 2 VIEWS: CPT

## 2022-04-26 RX ORDER — KETOROLAC TROMETHAMINE 30 MG/ML
30 INJECTION, SOLUTION INTRAMUSCULAR; INTRAVENOUS ONCE
Status: COMPLETED | OUTPATIENT
Start: 2022-04-26 | End: 2022-04-26

## 2022-04-26 RX ORDER — CYCLOBENZAPRINE HCL 5 MG
5 TABLET ORAL 2 TIMES DAILY PRN
Qty: 14 TABLET | Refills: 0 | Status: SHIPPED | OUTPATIENT
Start: 2022-04-26 | End: 2022-05-03

## 2022-04-26 RX ORDER — CYCLOBENZAPRINE HCL 5 MG
5 TABLET ORAL NIGHTLY PRN
Qty: 7 TABLET | Refills: 0 | Status: SHIPPED | OUTPATIENT
Start: 2022-04-26 | End: 2022-04-26 | Stop reason: SDUPTHER

## 2022-04-26 RX ADMIN — KETOROLAC TROMETHAMINE 30 MG: 30 INJECTION, SOLUTION INTRAMUSCULAR at 21:22

## 2022-04-26 ASSESSMENT — ENCOUNTER SYMPTOMS
NAUSEA: 0
COUGH: 1
ABDOMINAL PAIN: 0
BACK PAIN: 1
CHEST TIGHTNESS: 0
VOMITING: 0
SHORTNESS OF BREATH: 0

## 2022-04-26 ASSESSMENT — PAIN SCALES - GENERAL
PAINLEVEL_OUTOF10: 0
PAINLEVEL_OUTOF10: 3

## 2022-04-26 ASSESSMENT — PAIN - FUNCTIONAL ASSESSMENT
PAIN_FUNCTIONAL_ASSESSMENT: 0-10
PAIN_FUNCTIONAL_ASSESSMENT: 0-10

## 2022-04-26 NOTE — Clinical Note
Cleve Nixon was seen and treated in our emergency department on 4/26/2022. He may return to work on 04/27/2022. If you have any questions or concerns, please don't hesitate to call.       Gladystine Cure, DO

## 2022-04-27 LAB
EKG ATRIAL RATE: 74 BPM
EKG DIAGNOSIS: NORMAL
EKG P AXIS: 43 DEGREES
EKG P-R INTERVAL: 182 MS
EKG Q-T INTERVAL: 386 MS
EKG QRS DURATION: 100 MS
EKG QTC CALCULATION (BAZETT): 428 MS
EKG R AXIS: -46 DEGREES
EKG T AXIS: 108 DEGREES
EKG VENTRICULAR RATE: 74 BPM

## 2022-04-27 PROCEDURE — 93010 ELECTROCARDIOGRAM REPORT: CPT | Performed by: INTERNAL MEDICINE

## 2022-04-27 NOTE — ED PROVIDER NOTES
[address] [facility]  EMERGENCY DEPARTMENT ENCOUNTER      Pt Name: Larry Downs  MRN: [id number]  [place] 1970  Date of evaluation: 4/26/2022  Provider: Levy Ledbetter, [address] [place] [address]       Chief Complaint   Patient presents with    Back Pain     pt states he was here 2 weeks ago; finished antibotics for pna; states the pain in his upper back when laying flat just has not gone away; slight cough and sob          HISTORY OF PRESENT ILLNESS   (Location/Symptom, Timing/Onset, Context/Setting, Quality, Duration, Modifying Factors, Severity)  Note limiting factors. Larry Downs is a 49 y.o. male who presents to the emergency department complaining of left-sided thoracic back pain. Ongoing for last 2 or so weeks after he was seen and diagnosed with pneumonia. Pain seems to worsen with palpation and movement and if he lays down onto the bed on that side. Improves with change in position. He has had improved but intermittent nonproductive cough without significant shortness of breath or chest pain. Does report significant cardiac history but reports this feels similar to past episodes. No abdominal pain no nausea no vomit no fevers no chills. Nursing Notes were reviewed. PAST MEDICAL HISTORY     Past Medical History:   Diagnosis Date    CAD (coronary artery disease)     stent 2006 & 2008, balloon angioplasty in 2010, S/P NSTEMI 8/19/14 post CABG-->Cath patent grafts, s/p LCx ADELIA.     H/O coronary artery bypass surgery     s/p CABG X2 LIMA-LAD, SVG-OM.  Echo 8/2014 LVEF 50-55%    History of tobacco use     quit smoking     HLD (hyperlipidemia)     Hypertension     Controlled    PE (pulmonary embolism)     and DVT history         SURGICAL HISTORY       Past Surgical History:   Procedure Laterality Date    CARPAL TUNNEL RELEASE      CORONARY ARTERY BYPASS GRAFT  8-    CABG x 2    HX VASCULAR STENT           CURRENT MEDICATIONS       Discharge Medication List as of 2022 10:04 PM      CONTINUE these medications which have NOT CHANGED    Details   !! albuterol sulfate HFA (PROAIR HFA) 108 (90 Base) MCG/ACT inhaler Use every 4 hours 2 puffs while awake for 7-10 days then PRN wheezing  Dispense with SPACER and Instruct on use. May sub Ventolin or Proventil as needed per Insurance., Disp-18 g, R-1Normal      omeprazole (PRILOSEC) 40 MG delayed release capsule Take 40 mg by mouth dailyHistorical Med      !! albuterol sulfate HFA (VENTOLIN HFA) 108 (90 Base) MCG/ACT inhaler Inhale 2 puffs into the lungs 4 times daily as needed for Wheezing, Disp-18 g, R-0Normal      clopidogrel (PLAVIX) 75 MG tablet TAKE 1 TABLET BY MOUTH DAILY, Disp-90 tablet, R-1Normal      lisinopril (PRINIVIL;ZESTRIL) 20 MG tablet Take 1 tablet by mouth daily, Disp-90 tablet, R-3NO PRINT      atorvastatin (LIPITOR) 40 MG tablet TAKE 1 TABLET BY MOUTH NIGHTLY., Disp-90 tablet, R-2Normal      metoprolol tartrate (LOPRESSOR) 25 MG tablet Take 1 tablet by mouth 2 times daily, Disp-180 tablet, R-2Normal      aspirin 81 MG chewable tablet Take 1 tablet by mouth daily. , Disp-30 tablet, R-3       !! - Potential duplicate medications found. Please discuss with provider. ALLERGIES     Patient has no known allergies.     FAMILY HISTORY       Family History   Problem Relation Age of Onset    Heart Disease Father           SOCIAL HISTORY       Social History     Socioeconomic History    Marital status:      Spouse name: None    Number of children: None    Years of education: None    Highest education level: None   Occupational History    None   Tobacco Use    Smoking status: Former Smoker     Packs/day: 1.50     Years: 15.00     Pack years: 22.50     Types: Cigarettes     Quit date: 3/20/2006     Years since quittin.1    Smokeless tobacco: Never Used   Vaping Use    Vaping Use: Never used   Substance and Sexual Activity    Alcohol use: No    Drug use: None    Sexual activity: None Other Topics Concern    None   Social History Narrative    None     Social Determinants of Health     Financial Resource Strain:     Difficulty of Paying Living Expenses: Not on file   Food Insecurity:     Worried About Running Out of Food in the Last Year: Not on file    Vee of Food in the Last Year: Not on file   Transportation Needs:     Lack of Transportation (Medical): Not on file    Lack of Transportation (Non-Medical): Not on file   Physical Activity:     Days of Exercise per Week: Not on file    Minutes of Exercise per Session: Not on file   Stress:     Feeling of Stress : Not on file   Social Connections:     Frequency of Communication with Friends and Family: Not on file    Frequency of Social Gatherings with Friends and Family: Not on file    Attends [employer] Services: Not on file    Active Member of [address] [place] [address] [employer] or Organizations: Not on file    Attends Club or Organization Meetings: Not on file    Marital Status: Not on file   Intimate Partner Violence:     Fear of Current or Ex-Partner: Not on file    Emotionally Abused: Not on file    Physically Abused: Not on file    Sexually Abused: Not on file   Housing Stability:     Unable to Pay for Housing in the Last Year: Not on file    Number of [place] in the Last Year: Not on file    Unstable Housing in the Last Year: Not on file       SCREENINGS        [place] Coma Scale  Eye Opening: Spontaneous  Best Verbal Response: Oriented  Best Motor Response: Obeys commands  [place] Coma Scale Score: 15                   REVIEW OF SYSTEMS    (2-9 systems for level 4, 10 or more for level 5)   Review of Systems   Constitutional: Negative for chills and fever. HENT: Negative. Respiratory: Positive for cough. Negative for chest tightness and shortness of breath. Cardiovascular: Negative for chest pain. Gastrointestinal: Negative for abdominal pain, nausea and vomiting. Musculoskeletal: Positive for back pain.    Skin: Negative for rash.         PHYSICAL EXAM    (up to 7 for level 4, 8 or more for level 5)   RECENT VITALS:     Temp: 97.7 °F (36.5 °C),  Pulse: 89, Resp: 18, BP: (!) 170/68, SpO2: 100 %    Physical Exam  Constitutional:       Appearance: Normal appearance. HENT:      Head: Normocephalic and atraumatic. Cardiovascular:      Rate and Rhythm: Normal rate and regular rhythm. Pulmonary:      Effort: No respiratory distress. Breath sounds: No wheezing. Musculoskeletal:      Cervical back: Normal range of motion and neck supple. Comments: Tenderness thoracic back, no midline pain no step-offs no deformities. Increased pain with flexion extension side bending and rotation. There is no crepitus. No overlying skin change. Neurological:      Mental Status: He is alert. DIAGNOSTIC RESULTS     EKG: All EKG's are interpreted by the Emergency Department Physician who either signs or Co-signs this chart in the absence of a cardiologist.      The Ekg interpreted by me shows  normal sinus rhythm with a rate of 74  Axis is   Left axis deviation  QTc is  normal  Intervals and Durations are unremarkable. ST Segments: nonspecific changes    RADIOLOGY:   Non-plain film images such as CT, Ultrasound and MRI are read by the radiologist. Plain radiographic images are visualized and preliminarily interpreted by the emergency physician. Interpretation per the Radiologist below, if available at the time of this note:    XR CHEST (2 VW)   Final Result   No acute cardiopulmonary process. LABS:  Labs Reviewed - No data to display    All other labs were within normal range or not returned as of this dictation. EMERGENCY DEPARTMENT COURSE and DIFFERENTIAL DIAGNOSIS/MDM:   Amrit Hong is a 49 y.o. male who presents to the emergency department with the complaint of greater than 2-week history of thoracic back pain worsens with palpation and movement and position change.   Mildly hypertensive otherwise stable vitals. Denies any chest pain complaint, does have extensive cardiac history however as EKG was obtained does not show acute ST or T wave change. Low suspicion for ACS. His physical exam is most consistent with musculoskeletal pain. Recent pneumonia, possible pleurisy. X-ray did not demonstrate pneumothorax pulmonary edema pneumonia. Discharged on muscle accident with continued use of over-the-counter's. CRITICAL CARE TIME   Total Critical Care time was 0 minutes, excluding separately reportable procedures. There was a high probability of clinically significant/life threatening deterioration in the patient's condition which required my urgent intervention. Clinical concern   Intervention     CONSULTS:  None    PROCEDURES:  Unless otherwise noted below, none     Procedures        FINAL IMPRESSION      1. Acute thoracic myofascial strain, initial encounter          DISPOSITION/PLAN   DISPOSITION Decision To Discharge 04/26/2022 09:51:35 PM      [facility]:  [place] [facility]  [address] [facility]  [phone]    If symptoms worsen      DISCHARGE MEDICATIONS:  Discharge Medication List as of 4/26/2022 10:04 PM        Controlled Substances Monitoring:     No flowsheet data found.     (Please note that portions of this note were completed with a voice recognition program.  Efforts were made to edit the dictations but occasionally words are mis-transcribed.)    Keo Parson DO (electronically signed)  Attending Emergency Physician            Keo Parson DO  04/26/22 7480

## 2022-04-28 ENCOUNTER — APPOINTMENT (OUTPATIENT)
Dept: CT IMAGING | Age: 52
End: 2022-04-28
Payer: COMMERCIAL

## 2022-04-28 ENCOUNTER — HOSPITAL ENCOUNTER (EMERGENCY)
Age: 52
Discharge: ANOTHER HOSPITAL | End: 2022-04-29
Attending: STUDENT IN AN ORGANIZED HEALTH CARE EDUCATION/TRAINING PROGRAM
Payer: COMMERCIAL

## 2022-04-28 DIAGNOSIS — I21.4 NSTEMI (NON-ST ELEVATED MYOCARDIAL INFARCTION) (HCC): Primary | ICD-10-CM

## 2022-04-28 LAB
A/G RATIO: 1.6 (ref 1.1–2.2)
ALBUMIN SERPL-MCNC: 4.1 G/DL (ref 3.4–5)
ALP BLD-CCNC: 116 U/L (ref 40–129)
ALT SERPL-CCNC: 37 U/L (ref 10–40)
ANION GAP SERPL CALCULATED.3IONS-SCNC: 10 MMOL/L (ref 3–16)
AST SERPL-CCNC: 37 U/L (ref 15–37)
BASOPHILS ABSOLUTE: 0 K/UL (ref 0–0.2)
BASOPHILS RELATIVE PERCENT: 0.5 %
BILIRUB SERPL-MCNC: 0.5 MG/DL (ref 0–1)
BUN BLDV-MCNC: 10 MG/DL (ref 7–20)
CALCIUM SERPL-MCNC: 9.2 MG/DL (ref 8.3–10.6)
CHLORIDE BLD-SCNC: 102 MMOL/L (ref 99–110)
CO2: 27 MMOL/L (ref 21–32)
CREAT SERPL-MCNC: 0.9 MG/DL (ref 0.9–1.3)
EOSINOPHILS ABSOLUTE: 0.2 K/UL (ref 0–0.6)
EOSINOPHILS RELATIVE PERCENT: 3.9 %
GFR AFRICAN AMERICAN: >60
GFR NON-AFRICAN AMERICAN: >60
GLUCOSE BLD-MCNC: 138 MG/DL (ref 70–99)
HCT VFR BLD CALC: 46.2 % (ref 40.5–52.5)
HEMOGLOBIN: 15.6 G/DL (ref 13.5–17.5)
INFLUENZA A: NOT DETECTED
INFLUENZA B: NOT DETECTED
LYMPHOCYTES ABSOLUTE: 1.3 K/UL (ref 1–5.1)
LYMPHOCYTES RELATIVE PERCENT: 22.5 %
MCH RBC QN AUTO: 30 PG (ref 26–34)
MCHC RBC AUTO-ENTMCNC: 33.7 G/DL (ref 31–36)
MCV RBC AUTO: 88.9 FL (ref 80–100)
MONOCYTES ABSOLUTE: 0.6 K/UL (ref 0–1.3)
MONOCYTES RELATIVE PERCENT: 10.5 %
NEUTROPHILS ABSOLUTE: 3.6 K/UL (ref 1.7–7.7)
NEUTROPHILS RELATIVE PERCENT: 62.6 %
PDW BLD-RTO: 14.9 % (ref 12.4–15.4)
PLATELET # BLD: 198 K/UL (ref 135–450)
PMV BLD AUTO: 7.8 FL (ref 5–10.5)
POTASSIUM REFLEX MAGNESIUM: 3.8 MMOL/L (ref 3.5–5.1)
RBC # BLD: 5.19 M/UL (ref 4.2–5.9)
SARS-COV-2 RNA, RT PCR: NOT DETECTED
SODIUM BLD-SCNC: 139 MMOL/L (ref 136–145)
TOTAL PROTEIN: 6.6 G/DL (ref 6.4–8.2)
TROPONIN: 0.02 NG/ML
TROPONIN: 0.04 NG/ML
WBC # BLD: 5.7 K/UL (ref 4–11)

## 2022-04-28 PROCEDURE — 36415 COLL VENOUS BLD VENIPUNCTURE: CPT

## 2022-04-28 PROCEDURE — 99285 EMERGENCY DEPT VISIT HI MDM: CPT

## 2022-04-28 PROCEDURE — 87636 SARSCOV2 & INF A&B AMP PRB: CPT

## 2022-04-28 PROCEDURE — 96376 TX/PRO/DX INJ SAME DRUG ADON: CPT

## 2022-04-28 PROCEDURE — 96375 TX/PRO/DX INJ NEW DRUG ADDON: CPT

## 2022-04-28 PROCEDURE — 80053 COMPREHEN METABOLIC PANEL: CPT

## 2022-04-28 PROCEDURE — 74174 CTA ABD&PLVS W/CONTRAST: CPT

## 2022-04-28 PROCEDURE — 6360000002 HC RX W HCPCS: Performed by: STUDENT IN AN ORGANIZED HEALTH CARE EDUCATION/TRAINING PROGRAM

## 2022-04-28 PROCEDURE — 84484 ASSAY OF TROPONIN QUANT: CPT

## 2022-04-28 PROCEDURE — 93005 ELECTROCARDIOGRAM TRACING: CPT | Performed by: STUDENT IN AN ORGANIZED HEALTH CARE EDUCATION/TRAINING PROGRAM

## 2022-04-28 PROCEDURE — 96374 THER/PROPH/DIAG INJ IV PUSH: CPT

## 2022-04-28 PROCEDURE — 85025 COMPLETE CBC W/AUTO DIFF WBC: CPT

## 2022-04-28 PROCEDURE — 6360000004 HC RX CONTRAST MEDICATION: Performed by: STUDENT IN AN ORGANIZED HEALTH CARE EDUCATION/TRAINING PROGRAM

## 2022-04-28 RX ORDER — ONDANSETRON 2 MG/ML
4 INJECTION INTRAMUSCULAR; INTRAVENOUS ONCE
Status: COMPLETED | OUTPATIENT
Start: 2022-04-28 | End: 2022-04-28

## 2022-04-28 RX ORDER — MORPHINE SULFATE 4 MG/ML
4 INJECTION, SOLUTION INTRAMUSCULAR; INTRAVENOUS ONCE
Status: COMPLETED | OUTPATIENT
Start: 2022-04-28 | End: 2022-04-28

## 2022-04-28 RX ADMIN — IOPAMIDOL 85 ML: 755 INJECTION, SOLUTION INTRAVENOUS at 22:44

## 2022-04-28 RX ADMIN — MORPHINE SULFATE 4 MG: 4 INJECTION, SOLUTION INTRAMUSCULAR; INTRAVENOUS at 23:17

## 2022-04-28 RX ADMIN — ONDANSETRON HYDROCHLORIDE 4 MG: 2 INJECTION, SOLUTION INTRAMUSCULAR; INTRAVENOUS at 23:18

## 2022-04-28 ASSESSMENT — PAIN SCALES - GENERAL: PAINLEVEL_OUTOF10: 5

## 2022-04-29 ENCOUNTER — APPOINTMENT (OUTPATIENT)
Dept: CARDIAC CATH/INVASIVE PROCEDURES | Age: 52
DRG: 247 | End: 2022-04-29
Attending: INTERNAL MEDICINE
Payer: COMMERCIAL

## 2022-04-29 ENCOUNTER — HOSPITAL ENCOUNTER (INPATIENT)
Age: 52
LOS: 1 days | Discharge: HOME | DRG: 247 | End: 2022-04-30
Attending: INTERNAL MEDICINE | Admitting: INTERNAL MEDICINE
Payer: COMMERCIAL

## 2022-04-29 VITALS
OXYGEN SATURATION: 96 % | TEMPERATURE: 98.4 F | WEIGHT: 315 LBS | HEART RATE: 69 BPM | HEIGHT: 70 IN | BODY MASS INDEX: 45.1 KG/M2 | RESPIRATION RATE: 20 BRPM | SYSTOLIC BLOOD PRESSURE: 131 MMHG | DIASTOLIC BLOOD PRESSURE: 83 MMHG

## 2022-04-29 LAB
ANTI-XA UNFRAC HEPARIN: 0.12 IU/ML (ref 0.3–0.7)
APTT: 27.9 SEC (ref 26.2–38.6)
CHOLESTEROL, TOTAL: 201 MG/DL (ref 0–199)
EKG ATRIAL RATE: 86 BPM
EKG DIAGNOSIS: NORMAL
EKG P AXIS: 27 DEGREES
EKG P-R INTERVAL: 180 MS
EKG Q-T INTERVAL: 382 MS
EKG QRS DURATION: 100 MS
EKG QTC CALCULATION (BAZETT): 457 MS
EKG R AXIS: -58 DEGREES
EKG T AXIS: 97 DEGREES
EKG VENTRICULAR RATE: 86 BPM
HDLC SERPL-MCNC: 31 MG/DL (ref 40–60)
INR BLD: 1.21 (ref 0.88–1.12)
LDL CHOLESTEROL CALCULATED: 136 MG/DL
PROTHROMBIN TIME: 13.8 SEC (ref 9.9–12.7)
TRIGL SERPL-MCNC: 170 MG/DL (ref 0–150)
TROPONIN: 0.23 NG/ML
VLDLC SERPL CALC-MCNC: 34 MG/DL

## 2022-04-29 PROCEDURE — 6370000000 HC RX 637 (ALT 250 FOR IP): Performed by: INTERNAL MEDICINE

## 2022-04-29 PROCEDURE — B2131ZZ FLUOROSCOPY OF MULTIPLE CORONARY ARTERY BYPASS GRAFTS USING LOW OSMOLAR CONTRAST: ICD-10-PCS | Performed by: INTERNAL MEDICINE

## 2022-04-29 PROCEDURE — C1874 STENT, COATED/COV W/DEL SYS: HCPCS

## 2022-04-29 PROCEDURE — 2709999900 HC NON-CHARGEABLE SUPPLY

## 2022-04-29 PROCEDURE — 99152 MOD SED SAME PHYS/QHP 5/>YRS: CPT | Performed by: INTERNAL MEDICINE

## 2022-04-29 PROCEDURE — 80061 LIPID PANEL: CPT

## 2022-04-29 PROCEDURE — 6360000002 HC RX W HCPCS: Performed by: STUDENT IN AN ORGANIZED HEALTH CARE EDUCATION/TRAINING PROGRAM

## 2022-04-29 PROCEDURE — 85730 THROMBOPLASTIN TIME PARTIAL: CPT

## 2022-04-29 PROCEDURE — 2500000003 HC RX 250 WO HCPCS

## 2022-04-29 PROCEDURE — 6370000000 HC RX 637 (ALT 250 FOR IP)

## 2022-04-29 PROCEDURE — 6360000002 HC RX W HCPCS: Performed by: NURSE PRACTITIONER

## 2022-04-29 PROCEDURE — B2111ZZ FLUOROSCOPY OF MULTIPLE CORONARY ARTERIES USING LOW OSMOLAR CONTRAST: ICD-10-PCS | Performed by: INTERNAL MEDICINE

## 2022-04-29 PROCEDURE — C1887 CATHETER, GUIDING: HCPCS

## 2022-04-29 PROCEDURE — 6360000004 HC RX CONTRAST MEDICATION

## 2022-04-29 PROCEDURE — 027034Z DILATION OF CORONARY ARTERY, ONE ARTERY WITH DRUG-ELUTING INTRALUMINAL DEVICE, PERCUTANEOUS APPROACH: ICD-10-PCS | Performed by: INTERNAL MEDICINE

## 2022-04-29 PROCEDURE — 2580000003 HC RX 258: Performed by: NURSE PRACTITIONER

## 2022-04-29 PROCEDURE — 93459 L HRT ART/GRFT ANGIO: CPT

## 2022-04-29 PROCEDURE — C1769 GUIDE WIRE: HCPCS

## 2022-04-29 PROCEDURE — 36415 COLL VENOUS BLD VENIPUNCTURE: CPT

## 2022-04-29 PROCEDURE — B2151ZZ FLUOROSCOPY OF LEFT HEART USING LOW OSMOLAR CONTRAST: ICD-10-PCS | Performed by: INTERNAL MEDICINE

## 2022-04-29 PROCEDURE — 6360000002 HC RX W HCPCS

## 2022-04-29 PROCEDURE — 2580000003 HC RX 258: Performed by: STUDENT IN AN ORGANIZED HEALTH CARE EDUCATION/TRAINING PROGRAM

## 2022-04-29 PROCEDURE — 85610 PROTHROMBIN TIME: CPT

## 2022-04-29 PROCEDURE — 85520 HEPARIN ASSAY: CPT

## 2022-04-29 PROCEDURE — 92941 PRQ TRLML REVSC TOT OCCL AMI: CPT | Performed by: INTERNAL MEDICINE

## 2022-04-29 PROCEDURE — C1894 INTRO/SHEATH, NON-LASER: HCPCS

## 2022-04-29 PROCEDURE — 85347 COAGULATION TIME ACTIVATED: CPT

## 2022-04-29 PROCEDURE — 4A023N7 MEASUREMENT OF CARDIAC SAMPLING AND PRESSURE, LEFT HEART, PERCUTANEOUS APPROACH: ICD-10-PCS | Performed by: INTERNAL MEDICINE

## 2022-04-29 PROCEDURE — 6370000000 HC RX 637 (ALT 250 FOR IP): Performed by: NURSE PRACTITIONER

## 2022-04-29 PROCEDURE — 93010 ELECTROCARDIOGRAM REPORT: CPT | Performed by: INTERNAL MEDICINE

## 2022-04-29 PROCEDURE — 99223 1ST HOSP IP/OBS HIGH 75: CPT | Performed by: INTERNAL MEDICINE

## 2022-04-29 PROCEDURE — C1725 CATH, TRANSLUMIN NON-LASER: HCPCS

## 2022-04-29 PROCEDURE — C9600 PERC DRUG-EL COR STENT SING: HCPCS

## 2022-04-29 PROCEDURE — 93459 L HRT ART/GRFT ANGIO: CPT | Performed by: INTERNAL MEDICINE

## 2022-04-29 PROCEDURE — 2060000000 HC ICU INTERMEDIATE R&B

## 2022-04-29 PROCEDURE — 84484 ASSAY OF TROPONIN QUANT: CPT

## 2022-04-29 RX ORDER — HEPARIN SODIUM 1000 [USP'U]/ML
2000 INJECTION, SOLUTION INTRAVENOUS; SUBCUTANEOUS ONCE
Status: COMPLETED | OUTPATIENT
Start: 2022-04-29 | End: 2022-04-29

## 2022-04-29 RX ORDER — SODIUM CHLORIDE 0.9 % (FLUSH) 0.9 %
5-40 SYRINGE (ML) INJECTION PRN
Status: DISCONTINUED | OUTPATIENT
Start: 2022-04-29 | End: 2022-04-30 | Stop reason: HOSPADM

## 2022-04-29 RX ORDER — FENTANYL CITRATE 50 UG/ML
INJECTION, SOLUTION INTRAMUSCULAR; INTRAVENOUS
Status: COMPLETED | OUTPATIENT
Start: 2022-04-29 | End: 2022-04-29

## 2022-04-29 RX ORDER — MIDAZOLAM HYDROCHLORIDE 1 MG/ML
INJECTION INTRAMUSCULAR; INTRAVENOUS
Status: COMPLETED | OUTPATIENT
Start: 2022-04-29 | End: 2022-04-29

## 2022-04-29 RX ORDER — SODIUM CHLORIDE 0.9 % (FLUSH) 0.9 %
5-40 SYRINGE (ML) INJECTION EVERY 12 HOURS SCHEDULED
Status: DISCONTINUED | OUTPATIENT
Start: 2022-04-29 | End: 2022-04-30 | Stop reason: HOSPADM

## 2022-04-29 RX ORDER — LISINOPRIL 20 MG/1
20 TABLET ORAL DAILY
Status: DISCONTINUED | OUTPATIENT
Start: 2022-04-29 | End: 2022-04-30 | Stop reason: HOSPADM

## 2022-04-29 RX ORDER — ACETAMINOPHEN 325 MG/1
650 TABLET ORAL EVERY 6 HOURS PRN
Status: DISCONTINUED | OUTPATIENT
Start: 2022-04-29 | End: 2022-04-30 | Stop reason: HOSPADM

## 2022-04-29 RX ORDER — POLYETHYLENE GLYCOL 3350 17 G/17G
17 POWDER, FOR SOLUTION ORAL DAILY PRN
Status: DISCONTINUED | OUTPATIENT
Start: 2022-04-29 | End: 2022-04-30 | Stop reason: HOSPADM

## 2022-04-29 RX ORDER — CYCLOBENZAPRINE HCL 10 MG
5 TABLET ORAL 2 TIMES DAILY PRN
Status: DISCONTINUED | OUTPATIENT
Start: 2022-04-29 | End: 2022-04-30 | Stop reason: HOSPADM

## 2022-04-29 RX ORDER — ONDANSETRON 4 MG/1
4 TABLET, ORALLY DISINTEGRATING ORAL EVERY 8 HOURS PRN
Status: DISCONTINUED | OUTPATIENT
Start: 2022-04-29 | End: 2022-04-30 | Stop reason: HOSPADM

## 2022-04-29 RX ORDER — CLOPIDOGREL BISULFATE 75 MG/1
75 TABLET ORAL DAILY
Status: DISCONTINUED | OUTPATIENT
Start: 2022-04-29 | End: 2022-04-30 | Stop reason: HOSPADM

## 2022-04-29 RX ORDER — MORPHINE SULFATE 4 MG/ML
4 INJECTION, SOLUTION INTRAMUSCULAR; INTRAVENOUS ONCE
Status: COMPLETED | OUTPATIENT
Start: 2022-04-29 | End: 2022-04-29

## 2022-04-29 RX ORDER — ACETAMINOPHEN 650 MG/1
650 SUPPOSITORY RECTAL EVERY 6 HOURS PRN
Status: DISCONTINUED | OUTPATIENT
Start: 2022-04-29 | End: 2022-04-30 | Stop reason: HOSPADM

## 2022-04-29 RX ORDER — HEPARIN SODIUM 1000 [USP'U]/ML
4000 INJECTION, SOLUTION INTRAVENOUS; SUBCUTANEOUS PRN
Status: DISCONTINUED | OUTPATIENT
Start: 2022-04-29 | End: 2022-04-29 | Stop reason: HOSPADM

## 2022-04-29 RX ORDER — EPTIFIBATIDE 0.75 MG/ML
15 INJECTION, SOLUTION INTRAVENOUS CONTINUOUS
Status: DISCONTINUED | OUTPATIENT
Start: 2022-04-29 | End: 2022-04-30 | Stop reason: HOSPADM

## 2022-04-29 RX ORDER — ATORVASTATIN CALCIUM 80 MG/1
80 TABLET, FILM COATED ORAL NIGHTLY
Status: DISCONTINUED | OUTPATIENT
Start: 2022-04-29 | End: 2022-04-30 | Stop reason: HOSPADM

## 2022-04-29 RX ORDER — HEPARIN SODIUM 1000 [USP'U]/ML
4000 INJECTION, SOLUTION INTRAVENOUS; SUBCUTANEOUS PRN
Status: DISCONTINUED | OUTPATIENT
Start: 2022-04-29 | End: 2022-04-29

## 2022-04-29 RX ORDER — ASPIRIN 81 MG/1
81 TABLET, CHEWABLE ORAL DAILY
Status: DISCONTINUED | OUTPATIENT
Start: 2022-04-30 | End: 2022-04-30 | Stop reason: HOSPADM

## 2022-04-29 RX ORDER — HEPARIN SODIUM 1000 [USP'U]/ML
4000 INJECTION, SOLUTION INTRAVENOUS; SUBCUTANEOUS ONCE
Status: COMPLETED | OUTPATIENT
Start: 2022-04-29 | End: 2022-04-29

## 2022-04-29 RX ORDER — HEPARIN SODIUM 10000 [USP'U]/100ML
5-30 INJECTION, SOLUTION INTRAVENOUS CONTINUOUS
Status: DISCONTINUED | OUTPATIENT
Start: 2022-04-29 | End: 2022-04-29 | Stop reason: SDUPTHER

## 2022-04-29 RX ORDER — HEPARIN SODIUM 10000 [USP'U]/100ML
1240 INJECTION, SOLUTION INTRAVENOUS CONTINUOUS
Status: DISCONTINUED | OUTPATIENT
Start: 2022-04-29 | End: 2022-04-29

## 2022-04-29 RX ORDER — SODIUM CHLORIDE 9 MG/ML
INJECTION, SOLUTION INTRAVENOUS PRN
Status: DISCONTINUED | OUTPATIENT
Start: 2022-04-29 | End: 2022-04-30 | Stop reason: HOSPADM

## 2022-04-29 RX ORDER — HEPARIN SODIUM 1000 [USP'U]/ML
2000 INJECTION, SOLUTION INTRAVENOUS; SUBCUTANEOUS PRN
Status: DISCONTINUED | OUTPATIENT
Start: 2022-04-29 | End: 2022-04-29 | Stop reason: HOSPADM

## 2022-04-29 RX ORDER — TRAMADOL HYDROCHLORIDE 50 MG/1
50 TABLET ORAL EVERY 6 HOURS PRN
Status: DISCONTINUED | OUTPATIENT
Start: 2022-04-29 | End: 2022-04-30 | Stop reason: HOSPADM

## 2022-04-29 RX ORDER — HEPARIN SODIUM 1000 [USP'U]/ML
60 INJECTION, SOLUTION INTRAVENOUS; SUBCUTANEOUS ONCE
Status: DISCONTINUED | OUTPATIENT
Start: 2022-04-29 | End: 2022-04-29 | Stop reason: ALTCHOICE

## 2022-04-29 RX ORDER — PANTOPRAZOLE SODIUM 40 MG/1
40 TABLET, DELAYED RELEASE ORAL
Status: DISCONTINUED | OUTPATIENT
Start: 2022-04-30 | End: 2022-04-30 | Stop reason: HOSPADM

## 2022-04-29 RX ORDER — HEPARIN SODIUM 1000 [USP'U]/ML
2000 INJECTION, SOLUTION INTRAVENOUS; SUBCUTANEOUS PRN
Status: DISCONTINUED | OUTPATIENT
Start: 2022-04-29 | End: 2022-04-29

## 2022-04-29 RX ORDER — ONDANSETRON 2 MG/ML
4 INJECTION INTRAMUSCULAR; INTRAVENOUS EVERY 6 HOURS PRN
Status: DISCONTINUED | OUTPATIENT
Start: 2022-04-29 | End: 2022-04-30 | Stop reason: HOSPADM

## 2022-04-29 RX ADMIN — MIDAZOLAM HYDROCHLORIDE 1 MG: 1 INJECTION INTRAMUSCULAR; INTRAVENOUS at 16:12

## 2022-04-29 RX ADMIN — HEPARIN SODIUM 2000 UNITS: 1000 INJECTION INTRAVENOUS; SUBCUTANEOUS at 11:27

## 2022-04-29 RX ADMIN — HEPARIN SODIUM 4000 UNITS: 1000 INJECTION, SOLUTION INTRAVENOUS; SUBCUTANEOUS at 02:16

## 2022-04-29 RX ADMIN — ATORVASTATIN CALCIUM 80 MG: 80 TABLET, FILM COATED ORAL at 20:15

## 2022-04-29 RX ADMIN — HEPARIN SODIUM 1000 UNITS/HR: 10000 INJECTION, SOLUTION INTRAVENOUS; SUBCUTANEOUS at 02:19

## 2022-04-29 RX ADMIN — MIDAZOLAM HYDROCHLORIDE 1 MG: 1 INJECTION INTRAMUSCULAR; INTRAVENOUS at 15:36

## 2022-04-29 RX ADMIN — HEPARIN SODIUM 1000 UNITS/HR: 5000 INJECTION INTRAVENOUS; SUBCUTANEOUS at 09:44

## 2022-04-29 RX ADMIN — FENTANYL CITRATE 25 MCG: 50 INJECTION, SOLUTION INTRAMUSCULAR; INTRAVENOUS at 15:34

## 2022-04-29 RX ADMIN — LISINOPRIL 20 MG: 20 TABLET ORAL at 09:40

## 2022-04-29 RX ADMIN — FENTANYL CITRATE 25 MCG: 50 INJECTION, SOLUTION INTRAMUSCULAR; INTRAVENOUS at 15:36

## 2022-04-29 RX ADMIN — FENTANYL CITRATE 50 MCG: 50 INJECTION, SOLUTION INTRAMUSCULAR; INTRAVENOUS at 16:12

## 2022-04-29 RX ADMIN — MIDAZOLAM HYDROCHLORIDE 2 MG: 1 INJECTION INTRAMUSCULAR; INTRAVENOUS at 15:34

## 2022-04-29 RX ADMIN — CLOPIDOGREL BISULFATE 75 MG: 75 TABLET, FILM COATED ORAL at 09:40

## 2022-04-29 RX ADMIN — FENTANYL CITRATE 50 MCG: 50 INJECTION, SOLUTION INTRAMUSCULAR; INTRAVENOUS at 15:42

## 2022-04-29 RX ADMIN — EPTIFIBATIDE 15 MG/HR: 0.75 INJECTION, SOLUTION INTRAVENOUS at 17:03

## 2022-04-29 RX ADMIN — METOPROLOL TARTRATE 25 MG: 25 TABLET, FILM COATED ORAL at 11:20

## 2022-04-29 RX ADMIN — MORPHINE SULFATE 4 MG: 4 INJECTION, SOLUTION INTRAMUSCULAR; INTRAVENOUS at 06:24

## 2022-04-29 RX ADMIN — MIDAZOLAM HYDROCHLORIDE 1 MG: 1 INJECTION INTRAMUSCULAR; INTRAVENOUS at 15:42

## 2022-04-29 RX ADMIN — METOPROLOL TARTRATE 25 MG: 25 TABLET, FILM COATED ORAL at 20:15

## 2022-04-29 RX ADMIN — TRAMADOL HYDROCHLORIDE 50 MG: 50 TABLET, COATED ORAL at 20:15

## 2022-04-29 ASSESSMENT — LIFESTYLE VARIABLES: HOW OFTEN DO YOU HAVE A DRINK CONTAINING ALCOHOL: NEVER

## 2022-04-29 ASSESSMENT — PAIN SCALES - GENERAL
PAINLEVEL_OUTOF10: 5
PAINLEVEL_OUTOF10: 0

## 2022-04-29 NOTE — CONSULTS
[facility]  [phone]      Attending Physician: Jessee Doan MD  Reason for Consultation/Chief Complaint: CP    Subjective   History of Present Illness:  John Guerrero is a 49 y.o. patient who presented to the hospital with complaints of CP, was having back pain on right flank. States had PNA recently and treated at home, some back pain and left shoulder pain prior to that. Went to PCP and started on ABx, had pain in left shoulder ever since. Lots of coughing, + productive. Tuesday/wed stated caught stomach bug with ++diarrhea and nausea for 24 hr with temps to 101.9. Np Chest pain/pressure, states pain in left collar bone and occurs mostly when lays on back. Also had pain on L/R shoulder blades, then had muscle pain in middle of chest and came. States still has lots of pain when laying back. No pain with coughing (feels congested). States symptoms are a little different then prior CABG pain. Past Medical History:   has a past medical history of CAD (coronary artery disease), H/O coronary artery bypass surgery, History of tobacco use, HLD (hyperlipidemia), Hypertension, and PE (pulmonary embolism). Surgical History:   has a past surgical history that includes Coronary artery bypass graft (8-); Carpal tunnel release; and hx vascular stent. Social History:   reports that he quit smoking about 16 years ago. His smoking use included cigarettes. He has a 22.50 pack-year smoking history. He has never used smokeless tobacco. He reports that he does not drink alcohol. Family History:  family history includes Heart Disease in his father. Home Medications:  Were reviewed and are listed in nursing record and/or below  Prior to Admission medications    Medication Sig Start Date End Date Taking?  Authorizing Provider   cyclobenzaprine (FLEXERIL) 5 MG tablet Take 1 tablet by mouth 2 times daily as needed for Muscle spasms 4/26/22 5/3/22  Sreedhar Nugent, DO albuterol sulfate HFA (PROAIR HFA) 108 (90 Base) MCG/ACT inhaler Use every 4 hours 2 puffs while awake for 7-10 days then PRN wheezing  Dispense with SPACER and Instruct on use. May sub Ventolin or Proventil as needed per [facility]. 4/13/22   Gaby Rice PA-C   omeprazole (PRILOSEC) 40 MG delayed release capsule Take 40 mg by mouth daily    Historical Provider, MD   albuterol sulfate HFA (VENTOLIN HFA) 108 (90 Base) MCG/ACT inhaler Inhale 2 puffs into the lungs 4 times daily as needed for Wheezing 1/15/22   Brad Sutherland MD   clopidogrel (PLAVIX) 75 MG tablet TAKE 1 TABLET BY MOUTH DAILY 4/9/19   Paulino Reid MD   lisinopril (PRINIVIL;ZESTRIL) 20 MG tablet Take 1 tablet by mouth daily 5/3/18   Paulino Reid MD   atorvastatin (LIPITOR) 40 MG tablet TAKE 1 TABLET BY MOUTH NIGHTLY. 6/12/17   Paulino Reid MD   metoprolol tartrate (LOPRESSOR) 25 MG tablet Take 1 tablet by mouth 2 times daily 6/12/17   Paulino Reid MD   aspirin 81 MG chewable tablet Take 1 tablet by mouth daily.  8/21/14   Don Perry MD        CURRENT Medications:  clopidogrel (PLAVIX) tablet 75 mg, Daily  cyclobenzaprine (FLEXERIL) tablet 5 mg, BID PRN  lisinopril (PRINIVIL;ZESTRIL) tablet 20 mg, Daily  metoprolol tartrate (LOPRESSOR) tablet 25 mg, BID  [START ON 4/30/2022] pantoprazole (PROTONIX) tablet 40 mg, QAM AC  sodium chloride flush 0.9 % injection 5-40 mL, 2 times per day  sodium chloride flush 0.9 % injection 5-40 mL, PRN  0.9 % sodium chloride infusion, PRN  ondansetron (ZOFRAN-ODT) disintegrating tablet 4 mg, Q8H PRN   Or  ondansetron (ZOFRAN) injection 4 mg, Q6H PRN  acetaminophen (TYLENOL) tablet 650 mg, Q6H PRN   Or  acetaminophen (TYLENOL) suppository 650 mg, Q6H PRN  polyethylene glycol (GLYCOLAX) packet 17 g, Daily PRN  atorvastatin (LIPITOR) tablet 80 mg, Nightly  perflutren lipid microspheres (DEFINITY) injection 1.65 mg, ONCE PRN  [START ON 4/30/2022] aspirin chewable tablet 81 mg, Daily  heparin (porcine) injection 4,000 Units, PRN  heparin (porcine) injection 2,000 Units, PRN  heparin 25,000 units in dextrose 5% 250 mL (premix) infusion, Continuous        Allergies:  Patient has no known allergies. Review of Systems:   A 14 point review of symptoms completed. Pertinent positives identified in the HPI, all other review of symptoms negative as below.       Objective   PHYSICAL EXAM:    Vitals:    04/29/22 0750   BP: (!) 140/87   Pulse: 73   Resp: 18   Temp: 98 °F (36.7 °C)   SpO2: 96%    Weight: (!) 328 lb 8 oz (149 kg)         General Appearance:  Alert, cooperative, no distress, appears stated age, obese   Head:  Normocephalic, without obvious abnormality, atraumatic   Eyes:  PERRL, conjunctiva/corneas clear   Nose: Nares normal, no drainage or sinus tenderness   Throat: Lips, mucosa, and tongue normal   Neck: Supple, symmetrical, trachea midline, no adenopathy, thyroid: not enlarged, symmetric, no tenderness/mass/nodules, no carotid bruit or JVD   Lungs:   Clear to auscultation bilaterally, respirations unlabored   Chest Wall:  No deformity or tenderness   Heart:  Regular rate and rhythm, S1, S2 normal, no murmur, rub or gallop   Abdomen:   Soft, non-tender, bowel sounds active all four quadrants,  no masses, no organomegaly   Extremities: Extremities normal, atraumatic, no cyanosis or edema   Pulses: 2+ and symmetric   Skin: Skin color, texture, turgor normal, no rashes or lesions   Pysch: Normal mood and affect   Neurologic: Normal gross motor and sensory exam.         Labs   CBC:   Lab Results   Component Value Date    WBC 5.7 04/28/2022    RBC 5.19 04/28/2022    HGB 15.6 04/28/2022    HCT 46.2 04/28/2022    MCV 88.9 04/28/2022    RDW 14.9 04/28/2022     04/28/2022     CMP:  Lab Results   Component Value Date     04/28/2022    K 3.8 04/28/2022     04/28/2022    CO2 27 04/28/2022    BUN 10 04/28/2022    CREATININE 0.9 04/28/2022    GFRAA >60 04/28/2022    AGRATIO 1.6 04/28/2022    LABGLOM >60 2022    GLUCOSE 138 2022    PROT 6.6 2022    CALCIUM 9.2 2022    BILITOT 0.5 2022    ALKPHOS 116 2022    AST 37 2022    ALT 37 2022     PT/INR:  No results found for: PTINR  HgBA1c:  Lab Results   Component Value Date    LABA1C 5.8 2014     Lab Results   Component Value Date    TROPONINI 0.23 (H) 2022         Cardiac Data     Last [id number] NSR with LAD, LVH, Poor R wave progression    Echo: 2014    Normal left ventricular size and wall thickness. Normal left ventricular   systolic function with an estimated ejection fraction of 50-55% . No regional wall motion abnormalities. Normal right ventricular size and function. Stress Test:    Cath: 2014  1. Right dominant coronary arterial system with 40% diffuse disease in the  right coronary artery. In the left system there is what appears to be a  large bifurcating left anterior descending. The very large first diagonal  branch or lateral branch of the left anterior descending is occluded proximal  to the stents. The medial branch of the left anterior descending proceeds on  and has diffuse disease of 50% with small diagonal branches present with high  grade ostial stenoses. 2.  Two of 2 bypass grafts patent with an ABBI to the lateral wall left  anterior descending. Patent saphenous vein graft to the third obtuse  marginal branch, which is very large; however, there was thrombus at the  distal anastomosis and flow appears to be somewhat compromised. 3.  Successful PCI of the 100% occluded native circumflex artery with  restoration of LORENA-2 flow and resolution of ST-segment elevation. Studies:   CTPA:  1. No acute aortic syndrome. 2. No acute process in the chest, abdomen or pelvis. 2022  CXR:  No acute cardiopulmonary process. Assessment and Plan      1. Chest pain  2. Elevated trop c/w NSTEMI  3.  CAD   - s/p PCI ,  (Diag and LCX)   - 2014 CABG (LIMA-LAD, SVG-OM)  4. HLD  5. HTN  Hx of PE and DVT    PLAN  1. Cont ASA, Plavix, Lipitor, lopressor, lipitor  2. echo  3. Atypical CP, ? Supply demand from recent PNA/GI isues vs plaque rupture. D/w pt and d/w pt GDMT, CArdiac cath, pt concerned with rising troponin, plan for LHC to eval. B/R/Aes    Cont heparin ggt for nor      PT by Dr. Korey Pinon, previously, lives in [place], staying with fiance closer to here      Patient Active Problem List   Diagnosis    CAD (coronary artery disease)    Obesity    Chest pain    Hypertension    NSTEMI (non-ST elevated myocardial infarction) ([address]    S/P CABG (coronary artery bypass graft)    STEMI (ST elevation myocardial infarction) ([address]    Pulmonary embolism ([address]    DVT (deep venous thrombosis) ([address]    Thrombocytopenia ([address]    Acute respiratory failure with hypoxia ([address]    HLD (hyperlipidemia)           Thank you for allowing us to participate in the care of John Guerrero. Please call me with any questions [phone]. Chika Cruz MD, [facility] Cardiologist  [facility]  [phone] [facility]  [phone] [address] [place] [address]  4/29/2022 9:47 AM    I will address the patient's cardiac risk factors and adjusted pharmacologic treatment as needed. In addition, I have reinforced the need for patient directed risk factor modification. All questions and concerns were addressed to the patient/family. Alternatives to my treatment were discussed. The note was completed using EMR. Every effort was made to ensure accuracy; however, inadvertent computerized transcription errors may be present.

## 2022-04-29 NOTE — ED NOTES
0052-Called [facility] (Renae RN) requested physician at [facility] for admission    0105-Renae RN with [facility] called back and stated she spoke with [facility] and they stated they have no beds until late tomorrow afternoon. Dr Plasencia Sender notified. Dr Plasencia Sender spoke with patient and he is okay with going to [facility]. Renae notified and stated she would check with Adelina Diaz.      Saurabh Bean  04/29/22 0109    0120-Dr Rama Chi returned call and spoke with Dr Steph Bradshaw  04/29/22 0125

## 2022-04-29 NOTE — PROGRESS NOTES
Per Low-dose heparin drip protocol:    Wt = 145.2 kg  IBW = 73 kg    Heparin to be dosed on adjusted body wt = 102 kg    Baseline aPTT = 27.9 sec  Bolus dose = 4000 units  (60 units/kg with 4000 unit max)    Initiate drip at 12 units/kg/h ( with initial max rate of 1000 u/h) = 1000 units/h (10 ml/h)     First anti-Xa ordered for 4/29 @ 1000    Adjust drip as needed per the following protocol:    Pharmacy to manage Heparin - contact for questions. anti-Xa < 0.1    Heparin 60 units/kg bolus  Increase infusion by 4 units/kg/hr = 4.1 ml/h  anti-Xa 0.1-0.29 Heparin 30 units/kg bolus Increase infusion by 2 units/kg/hr = 2 ml/h  anti-Xa 0.3-0.7    No bolus No change   anti-Xa 0.71-0.8   No bolus Decrease infusion by 1 units/kg/hr = 1 ml/h  anti-Xa 0.81-0.99    No bolus Decrease infusion by 2 units/kg/hr = 2 ml/h  anti-Xa 1 or more     Hold heparin for 1 hour Decrease infusion by 3 units/kg/hr = 3.1 ml/h    Obtain anti-Xa 6-8 hours after bolus and 6 hours after any dose change until two consecutive therapeutic anti-Xa are achieved- then daily.

## 2022-04-29 NOTE — PROCEDURES
Brief Pre-Op Note/Sedation Assessment      Erika Pressley  1970  [id number]  3:28 PM    Planned Procedure: Cardiac Catheterization Procedure  Post Procedure Plan: Return to same level of care  Consent: I have discussed with the patient and/or the patient representative the indication, alternatives, and the possible risks and/or complications of the planned procedure and the anesthesia methods. The patient and/or patient representative appear to understand and agree to proceed. Chief Complaint:   NSTEMI      Indications for Cath Procedure:  1. Presentation:  ACS > 24 hrs and Worsening Angina  2. Anginal Classification within 2 weeks:  CCS IV - Inability to perform any activity without angina or angina at rest, i.e., severe limitation  3. Angina Symptoms Assessment:  Atypical Chest Pain  4. Heart Failure Class within last 2 weeks:  Yes:  Heart Failure Type: Unknown Severity:  Class II - Symptoms of HF on ordinary exertion  5. Cardiovascular Instability:  No    Prior Ischemic Workup/Eval:  1. Pre-Procedural Medications: Yes: Aspirin, Beta Blockers and STATIN  2. Stress Test Completed? No    Does Patient need surgery?   Cath Valve Surgery:  No    Pre-Procedure Medical History:  Vital Signs:  BP (!) 145/76   Pulse 66   Temp 97.9 °F (36.6 °C) (Oral)   Resp 18   Ht 5' 10\" (1.778 m)   Wt (!) 328 lb 8 oz (149 kg)   SpO2 95%   BMI 47.13 kg/m²     Allergies:  No Known Allergies  Medications:    Current Facility-Administered Medications   Medication Dose Route Frequency Provider Last Rate Last Admin    clopidogrel (PLAVIX) tablet 75 mg  75 mg Oral Daily NILS Bell CNP   75 mg at 04/29/22 0940    cyclobenzaprine (FLEXERIL) tablet 5 mg  5 mg Oral BID PRN NILS Bell CNP        lisinopril (PRINIVIL;ZESTRIL) tablet 20 mg  20 mg Oral Daily NILS Bell CNP   20 mg at 04/29/22 0940    metoprolol tartrate (LOPRESSOR) tablet 25 mg  25 mg Oral BID NILS Bell CNP   25 mg at 04/29/22 1120    [START ON 4/30/2022] pantoprazole (PROTONIX) tablet 40 mg  40 mg Oral QAM AC NILS Mills - CNP        sodium chloride flush 0.9 % injection 5-40 mL  5-40 mL IntraVENous 2 times per day Zari Morgan, NILS - CNP        sodium chloride flush 0.9 % injection 5-40 mL  5-40 mL IntraVENous PRN Zari Morgan, NILS - CNP        0.9 % sodium chloride infusion   IntraVENous PRN NILS Mills - CESARIO        ondansetron (ZOFRAN-ODT) disintegrating tablet 4 mg  4 mg Oral Q8H PRN NILS Mills - CNP        Or    ondansetron [facility]) injection 4 mg  4 mg IntraVENous Q6H PRN Zari Morgan, NILS - CNP        acetaminophen (TYLENOL) tablet 650 mg  650 mg Oral Q6H PRN Zari Morgan, NILS - CNP        Or    acetaminophen (TYLENOL) suppository 650 mg  650 mg Rectal Q6H PRN Zari Morgan, NILS - CESARIO        polyethylene glycol (GLYCOLAX) packet 17 g  17 g Oral Daily PRN NILS Mills - CNP        atorvastatin (LIPITOR) tablet 80 mg  80 mg Oral Nightly NILS Mills - CESARIO        perflutren lipid microspheres (DEFINITY) injection 1.65 mg  1.5 mL IntraVENous ONCE PRN NILS Mills - CNP        [START ON 4/30/2022] aspirin chewable tablet 81 mg  81 mg Oral Daily NILS Mills CNP        heparin (porcine) injection 4,000 Units  4,000 Units IntraVENous PRN NILS Mills - CNP        heparin (porcine) injection 2,000 Units  2,000 Units IntraVENous PRN Zari Morgan, NILS - CNP        heparin 25,000 units in dextrose 5% 250 mL (premix) infusion  1,240 Units/hr IntraVENous Continuous NILS Mills - CNP   Stopped at 04/29/22 1524       Past Medical History:    Past Medical History:   Diagnosis Date    CAD (coronary artery disease)     stent 2006 & 2008, balloon angioplasty in 2010, S/P NSTEMI 8/19/14 post CABG-->Cath patent grafts, s/p LCx ADELIA.     H/O coronary artery bypass surgery     s/p CABG X2 LIMA-LAD, SVG-OM.  Echo 8/2014 LVEF 50-55%    History of tobacco use     quit smoking     HLD (hyperlipidemia)     Hypertension     Controlled    PE (pulmonary embolism)     and DVT history       Surgical History:    Past Surgical History:   Procedure Laterality Date    CARPAL TUNNEL RELEASE      CORONARY ARTERY BYPASS GRAFT  8-    CABG x 2    HX VASCULAR STENT               Pre-Sedation:  Pre-Sedation Documentation and Exam:  I have assessed the patient and reviewed the H&P on the chart. Prior History of Anesthesia Complications:   none    Modified Mallampati:  III (soft palate, base of uvula visible)    ASA Classification:  Class 3 - A patient with severe systemic disease that limits activity but is not incapacitating    Jennifer Scale: Activity:  2 - Able to move 4 extremities voluntarily on command  Respiration:  2 - Able to breathe deeply and cough freely  Circulation:  2 - BP+/- 20mmHg of normal  Consciousness:  2 - Fully awake  Oxygen Saturation (color):  2 - Able to maintain oxygen saturation >92% on room air    Sedation/Anesthesia Plan:  Guard the patient's safety and welfare. Minimize physical discomfort and pain. Minimize negative psychological responses to treatment by providing sedation and analgesia and maximize the potential amnesia. Patient to meet pre-procedure discharge plan.     Medication Planned:  midazolam intravenously and fentanyl intravenously    Patient is an appropriate candidate for plan of sedation:   yes      Electronically signed by Jessica Post MD on 4/29/2022 at 3:28 PM

## 2022-04-29 NOTE — CONSULTS
Pharmacy to Manage Heparin Infusion per [facility]    Dx: NSTEMI  Pt wt = 102 kg (adjusted body weight). Baseline aPTT = 27.9 sec at 0120. ** Patient transferred from [facility]    Oral factor Xa-inhibitors may alter and elevate anti-Xa levels used for unfractionated heparin monitoring. As a result, anti-Xa monitoring is not accurate while Xa-inhibitor activity is detectable. Utilize aPTT monitoring when patient received an oral factor Xa-inhibitor (apixaban, betrixaban, edoxaban or rivaroxaban) within 72 hours prior to admission (please document last administration time). The goal is to allow a washout of oral factor Xa-inhibitors by using aPTT for 72 hours, then change to ant-Xa levels for UFH. Low Dose Heparin Infusion  Heparin 4000 IVP bolus given at 0216  Heparin infusion [facility] at rate of 1000 units/hr (recommended initial max dose 1000 units./hr). Recheck aPTT in 6 hours. Goal anti-Xa 0.3-0.7 IU/mL  Goal aPTT = 60-90 seconds. Pharmacy to manage Heparin - contact for questions. anti-Xa < 0.1    Heparin 60 units/kg bolus  Increase infusion by 4 units/kg/hr  anti-Xa 0.1-0.29 Heparin 30 units/kg bolus Increase infusion by 2 units/kg/hr  anti-Xa 0.3-0.7    No bolus No change   anti-Xa 0.71-0.8   No bolus Decrease infusion by 1 units/kg/hr  anti-Xa 0.81-0.99    No bolus Decrease infusion by 2 units/kg/hr  anti-Xa 1 or more     Hold heparin for 1 hour Decrease infusion by 3 units/kg/hr    Obtain anti-Xa hours after bolus and 6 hours after any dose change until two consecutive therapeutic anti-Xa are achieved- then daily.     Next anti-Xa level due at [id number], PharmD 4/29/2022  9:26 AM    .  4/29/2022  anti-Xa level = 0.12 IU/mL at 1032  Give 2000 units bolus  Increase Heparin infusion rate to 1240 units/hr  Recheck level in 6 hours  Sharda Smith, ShilaD  4/29/2022 10:55 AM

## 2022-04-29 NOTE — ED TRIAGE NOTES
[facility] BROUGHT PT IN FOR BACK PAIN. Patient seen 2 days ago for same. Patient states the pain has moved from just left shoulder blade to right shoulder blade. patient appears very anxious about breathing and back pain .

## 2022-04-29 NOTE — H&P
Hospital Medicine History & Physical      PCP: No primary care provider on file. Date of Admission: 4/29/2022    Date of Service: Pt seen/examined on 4/29/2023 and admitted to inpatient with expected LOS greater than two midnights due to medical therapy. Chief Complaint:  CP      History Of Present Illness:    49 y.o. male with a PMH of CAD s/p CABG '17, HLD, GERD, Obesity, PE/DVT, Anxiety/Depression and HTN who presented to [facility] with a complaint of back pain. He reports a recent hx of URI and possible thoracic strain which was evaluated in ED over the last couple weeks. He completed abx. He states acute onset of back pain between shoulder blades. He denies N/V/D/F/C. Patient evaluated at [facility] ED. CTA chest abdomen pelvis did not reveal any acute aortic pathology and no acute process in the chest abdomen or pelvis. Troponin 0.02->0. 04. BNP 66, Patient was initiated on a heparin drip for NSTEMI and subsequently transferred to [facility]. Past Medical History:          Diagnosis Date    CAD (coronary artery disease)     stent 2006 & 2008, balloon angioplasty in 2010, S/P NSTEMI 8/19/14 post CABG-->Cath patent grafts, s/p LCx ADELIA.     H/O coronary artery bypass surgery     s/p CABG X2 LIMA-LAD, SVG-OM. Echo 8/2014 LVEF 50-55%    History of tobacco use     quit smoking     HLD (hyperlipidemia)     Hypertension     Controlled    PE (pulmonary embolism)     and DVT history       Past Surgical History:          Procedure Laterality Date    CARPAL TUNNEL RELEASE      CORONARY ARTERY BYPASS GRAFT  8-    CABG x 2    HX VASCULAR STENT         Medications Prior to Admission:      Prior to Admission medications    Medication Sig Start Date End Date Taking?  Authorizing Provider   cyclobenzaprine (FLEXERIL) 5 MG tablet Take 1 tablet by mouth 2 times daily as needed for Muscle spasms 4/26/22 5/3/22  Damon Parham, DO   albuterol sulfate HFA (PROAIR HFA) 108 (90 Base) MCG/ACT inhaler Use every 4 hours 2 puffs while awake for 7-10 days then PRN wheezing  Dispense with SPACER and Instruct on use. May sub Ventolin or Proventil as needed per [employer]. 4/13/22   Gaby Rice PA-C   omeprazole (PRILOSEC) 40 MG delayed release capsule Take 40 mg by mouth daily    Historical Provider, MD   albuterol sulfate HFA (VENTOLIN HFA) 108 (90 Base) MCG/ACT inhaler Inhale 2 puffs into the lungs 4 times daily as needed for Wheezing 1/15/22   Brad Sutherland MD   clopidogrel (PLAVIX) 75 MG tablet TAKE 1 TABLET BY MOUTH DAILY 4/9/19   Tierra Pierre MD   lisinopril (PRINIVIL;ZESTRIL) 20 MG tablet Take 1 tablet by mouth daily 5/3/18   Tierra Pierre MD   atorvastatin (LIPITOR) 40 MG tablet TAKE 1 TABLET BY MOUTH NIGHTLY. 6/12/17   Tierra Pierre MD   metoprolol tartrate (LOPRESSOR) 25 MG tablet Take 1 tablet by mouth 2 times daily 6/12/17   Tierra Pierre MD   aspirin 81 MG chewable tablet Take 1 tablet by mouth daily. 8/21/14   Cortez Hinton MD       Allergies:  Patient has no known allergies. Social History:      The patient currently lives at home    TOBACCO:   reports that he quit smoking about 16 years ago. His smoking use included cigarettes. He has a 22.50 pack-year smoking history. He has never used smokeless tobacco.  ETOH:   reports no history of alcohol use. E-Cigarettes/Vaping Use     Questions Responses    E-Cigarette/Vaping Use Never User    Start Date     Passive Exposure     Quit Date     Counseling Given     Comments             Family History:      Reviewed in detail and negative for DM, CAD, Cancer, CVA. Positive as follows:        Problem Relation Age of Onset    Heart Disease Father        REVIEW OF SYSTEMS COMPLETED:   Pertinent positives as noted in the HPI. All other systems reviewed and negative.     PHYSICAL EXAM PERFORMED:    BP (!) 140/87   Pulse 73   Temp 98 °F (36.7 °C) (Oral)   Resp 18   Ht 5' 10\" (1.778 m)   Wt (!) 328 lb 8 oz (149 kg)   SpO2 96%   BMI 47.13 kg/m²     General appearance:  Obese. No apparent distress, appears stated age and cooperative. HEENT:  Normal cephalic, atraumatic without obvious deformity. Pupils equal, round, and reactive to light. Extra ocular muscles intact. Conjunctivae/corneas clear. Neck: Supple, with full range of motion. No jugular venous distention. Trachea midline. Respiratory:  Normal respiratory effort. Clear to auscultation, bilaterally without Rales/Wheezes/Rhonchi. Cardiovascular:  Regular rate and rhythm with normal S1/S2 without murmurs, rubs or gallops. Abdomen: Soft, non-tender, non-distended with normal bowel sounds. Musculoskeletal:  No clubbing, cyanosis or edema bilaterally. Full range of motion without deformity. Skin: Skin color, texture, turgor normal.  No rashes or lesions. Neurologic:  Neurovascularly intact without any focal sensory/motor deficits. Cranial nerves: II-XII intact, grossly non-focal.  Psychiatric:  Alert and oriented, thought content appropriate, normal insight  Capillary Refill: Brisk,3 seconds, normal  Peripheral Pulses: +2 palpable, equal bilaterally       Labs:     Recent Labs     04/28/22 2120   WBC 5.7   HGB 15.6   HCT 46.2        Recent Labs     04/28/22 2120      K 3.8      CO2 27   BUN 10   CREATININE 0.9   CALCIUM 9.2     Recent Labs     04/28/22 2120   AST 37   ALT 37   BILITOT 0.5   ALKPHOS 116     No results for input(s): INR in the last 72 hours.   Recent Labs     04/28/22 2120 04/28/22 2236   TROPONINI 0.02* 0.04*       Urinalysis:      Lab Results   Component Value Date    NITRU Negative 04/13/2022    WBCUA 0-2 04/13/2022    BACTERIA Rare 04/13/2022    RBCUA 5-10 04/13/2022    BLOODU SMALL 04/13/2022    SPECGRAV 1.025 04/13/2022    GLUCOSEU Negative 04/13/2022       Radiology:     CXR: I have reviewed the CXR with the following interpretation: No acute cardiopulmonary process  EKG:  I have reviewed the EKG with the following interpretation: Normal sinus rhythmleft axis, left anterior fascicular block, minimal voltage criteria for LVH, may be normal variant T wave abnormality, consider lateral ischemia vs repolarization change    No orders to display       ASSESSMENT:    Active Hospital Problems    Diagnosis Date Noted    NSTEMI (non-ST elevated myocardial infarction) ([facility] [address] [I21.4] 08/08/2014         PLAN:    NSTEMI  -Trop uptrending  -Cardiology consult  -Echo ordered  -Hep gtt  -ASA, Statin, Plavix, BB  -Check A1C  -S/P CABG 2014  -PCI/ADELIA to 80% Cx lesion 7/22/2021  -CT:No acute aortic syndrome. No acute process in the chest, abdomen or pelvis    HLD-Statin, lipid panel    HTN-BB/Lisinopril    GERD-PPI    Obesity body mass index is 47.13 kg/m². Complicating assessment and treatment. Placing patient at risk for multiple co-morbidities as well as early death and contributing to the patient's presentation. Counseled on weight loss    DVT Prophylaxis: Hep gtt  Diet: Diet NPO  Code Status: Full Code    PT/OT Eval Status: ambulatory    Dispo - 1-2 days       NILS Nelson - CNP    Thank you No primary care provider on file. for the opportunity to be involved in this patient's care. If you have any questions or concerns please feel free to contact me at [phone].

## 2022-04-29 NOTE — ED PROVIDER NOTES
[address] ED      CHIEF COMPLAINT  Back Pain     HISTORY OF PRESENT ILLNESS  Dai Jensen is a 49 y.o. male  who presents to the ED complaining of back pain. Patient states that he was seen recently and diagnosed with thoracic myofascial strain after having pneumonia. States that his symptoms have not really been improving however today he had acute onset of \"deep\" back pain between his shoulder blades that lasted for a few minutes. Called 911 who advised him to take 4 aspirin and he states that once he rested for a little bit and took aspirin and the pain had improved significantly. He is not complaining of pain in his right shoulder which is new for him. He states that he has had some pain in his bilateral pectorals as well since this happened. He denies any associated shortness of breath, does have a previous history of provoked pulmonary embolisms not currently on anticoagulation, also previous history of aortic aneurysm. He denies any associated numbness or tingling. He denies fevers. He denies leg pain or leg swelling that is new. He denies other complaints or concerns. No other complaints, modifying factors or associated symptoms. I have reviewed the following from the nursing documentation. Past Medical History:   Diagnosis Date    CAD (coronary artery disease)     stent 2006 & 2008, balloon angioplasty in 2010, S/P NSTEMI 8/19/14 post CABG-->Cath patent grafts, s/p LCx ADELIA.     H/O coronary artery bypass surgery     s/p CABG X2 LIMA-LAD, SVG-OM.  Echo 8/2014 LVEF 50-55%    History of tobacco use     quit smoking     HLD (hyperlipidemia)     Hypertension     Controlled    PE (pulmonary embolism)     and DVT history     Past Surgical History:   Procedure Laterality Date    CARPAL TUNNEL RELEASE      CORONARY ARTERY BYPASS GRAFT  8-    CABG x 2    HX VASCULAR STENT       Family History   Problem Relation Age of Onset    Heart Disease Father      Social History     Socioeconomic History    Marital status:      Spouse name: Not on file    Number of children: Not on file    Years of education: Not on file    Highest education level: Not on file   Occupational History    Not on file   Tobacco Use    Smoking status: Former Smoker     Packs/day: 1.50     Years: 15.00     Pack years: 22.50     Types: Cigarettes     Quit date: 3/20/2006     Years since quittin.1    Smokeless tobacco: Never Used   Vaping Use    Vaping Use: Never used   Substance and Sexual Activity    Alcohol use: No    Drug use: Not on file    Sexual activity: Not on file   Other Topics Concern    Not on file   Social History Narrative    Not on file     Social Determinants of Health     Financial Resource Strain:     Difficulty of Paying Living Expenses: Not on file   Food Insecurity:     Worried About [address] in the Last Year: Not on file    Vee of Food in the Last Year: Not on file   Transportation Needs:     Lack of Transportation (Medical): Not on file    Lack of Transportation (Non-Medical):  Not on file   Physical Activity:     Days of Exercise per Week: Not on file    Minutes of Exercise per Session: Not on file   Stress:     Feeling of Stress : Not on file   Social Connections:     Frequency of Communication with Friends and Family: Not on file    Frequency of Social Gatherings with Friends and Family: Not on file    Attends Synagogue Services: Not on file    Active Member of Clubs or Organizations: Not on file    Attends Club or Organization Meetings: Not on file    Marital Status: Not on file   Intimate Partner Violence:     Fear of Current or Ex-Partner: Not on file    Emotionally Abused: Not on file    Physically Abused: Not on file    Sexually Abused: Not on file   Housing Stability:     Unable to Pay for Housing in the Last Year: Not on file    Number of [place] in the Last Year: Not on file    Unstable Housing in the Last Year: Not on file     Current Facility-Administered Medications   Medication Dose Route Frequency Provider Last Rate Last Admin    heparin (porcine) injection 4,000 Units  4,000 Units IntraVENous Once Alma Delia Schilling MD        heparin (porcine) injection 4,000 Units  4,000 Units IntraVENous PRN Alma Delia Schilling MD        heparin (porcine) injection 2,000 Units  2,000 Units IntraVENous PRN Alma Delia Schilling MD        heparin 25,000 units in dextrose 5% 250 mL (premix) infusion  5-30 Units/kg/hr IntraVENous Continuous Alma Delia Schilling MD         Current Outpatient Medications   Medication Sig Dispense Refill    cyclobenzaprine (FLEXERIL) 5 MG tablet Take 1 tablet by mouth 2 times daily as needed for Muscle spasms 14 tablet 0    albuterol sulfate HFA (PROAIR HFA) 108 (90 Base) MCG/ACT inhaler Use every 4 hours 2 puffs while awake for 7-10 days then PRN wheezing  Dispense with SPACER and Instruct on use. May sub Ventolin or Proventil as needed per [employer]. 18 g 1    omeprazole (PRILOSEC) 40 MG delayed release capsule Take 40 mg by mouth daily      albuterol sulfate HFA (VENTOLIN HFA) 108 (90 Base) MCG/ACT inhaler Inhale 2 puffs into the lungs 4 times daily as needed for Wheezing 18 g 0    clopidogrel (PLAVIX) 75 MG tablet TAKE 1 TABLET BY MOUTH DAILY 90 tablet 1    lisinopril (PRINIVIL;ZESTRIL) 20 MG tablet Take 1 tablet by mouth daily 90 tablet 3    atorvastatin (LIPITOR) 40 MG tablet TAKE 1 TABLET BY MOUTH NIGHTLY. 90 tablet 2    metoprolol tartrate (LOPRESSOR) 25 MG tablet Take 1 tablet by mouth 2 times daily 180 tablet 2    aspirin 81 MG chewable tablet Take 1 tablet by mouth daily. 30 tablet 3     No Known Allergies    REVIEW OF SYSTEMS  10 systems reviewed, pertinent positives per HPI otherwise noted to be negative.     PHYSICAL EXAM  BP (!) 150/89   Pulse 79   Temp 98.4 °F (36.9 °C) (Oral)   Resp 18   Ht 5' 10\" (1.778 m)   Wt (!) 320 lb (145.2 kg)   SpO2 94%   BMI 45.92 kg/m²    GENERAL APPEARANCE: Awake and alert. Cooperative. No acute distress. HENT: Normocephalic. Atraumatic. NECK: Supple. EYES: PERRL. EOM's grossly intact. HEART/CHEST: RRR. No murmurs. LUNGS: Respirations unlabored. CTAB. Good air exchange. Speaking comfortably in full sentences. ABDOMEN: No tenderness. Soft. Non-distended. No masses. No organomegaly. No guarding or rebound. MUSCULOSKELETAL: No extremity edema. Compartments soft. No deformity. No tenderness in the extremities. All extremities neurovascularly intact. BACK: Tenderness palpation over the right paraspinal muscles in the thoracic region  SKIN: Warm and dry. No acute rashes. NEUROLOGICAL: Alert and oriented. CN's 2-12 intact. No gross facial drooping. Strength 5/5, sensation intact. PSYCHIATRIC: Normal mood and affect. LABS  I have reviewed all labs for this visit.    Results for orders placed or performed during the hospital encounter of 04/28/22   COVID-19 & Influenza Combo    Specimen: Nasopharyngeal Swab   Result Value Ref Range    SARS-CoV-2 RNA, RT PCR NOT DETECTED NOT DETECTED    INFLUENZA A NOT DETECTED NOT DETECTED    INFLUENZA B NOT DETECTED NOT DETECTED   CBC with Auto Differential   Result Value Ref Range    WBC 5.7 4.0 - 11.0 K/uL    RBC 5.19 4.20 - 5.90 M/uL    Hemoglobin 15.6 13.5 - 17.5 g/dL    Hematocrit 46.2 40.5 - 52.5 %    MCV 88.9 80.0 - 100.0 fL    MCH 30.0 26.0 - 34.0 pg    MCHC 33.7 31.0 - 36.0 g/dL    RDW 14.9 12.4 - 15.4 %    Platelets 012 293 - 491 K/uL    MPV 7.8 5.0 - 10.5 fL    Neutrophils % 62.6 %    Lymphocytes % 22.5 %    Monocytes % 10.5 %    Eosinophils % 3.9 %    Basophils % 0.5 %    Neutrophils Absolute 3.6 1.7 - 7.7 K/uL    Lymphocytes Absolute 1.3 1.0 - 5.1 K/uL    Monocytes Absolute 0.6 0.0 - 1.3 K/uL    Eosinophils Absolute 0.2 0.0 - 0.6 K/uL    Basophils Absolute 0.0 0.0 - 0.2 K/uL   Comprehensive Metabolic Panel w/ Reflex to MG   Result Value Ref Range    Sodium 139 136 - 145 mmol/L    Potassium reflex Magnesium 3.8 3.5 - 5.1 mmol/L    Chloride 102 99 - 110 mmol/L    CO2 27 21 - 32 mmol/L    Anion Gap 10 3 - 16    Glucose 138 (H) 70 - 99 mg/dL    BUN 10 7 - 20 mg/dL    CREATININE 0.9 0.9 - 1.3 mg/dL    GFR Non-African American >60 >60    GFR African American >60 >60    Calcium 9.2 8.3 - 10.6 mg/dL    Total Protein 6.6 6.4 - 8.2 g/dL    Albumin 4.1 3.4 - 5.0 g/dL    Albumin/Globulin Ratio 1.6 1.1 - 2.2    Total Bilirubin 0.5 0.0 - 1.0 mg/dL    Alkaline Phosphatase 116 40 - 129 U/L    ALT 37 10 - 40 U/L    AST 37 15 - 37 U/L   Troponin   Result Value Ref Range    Troponin 0.02 (H) <0.01 ng/mL   Troponin   Result Value Ref Range    Troponin 0.04 (H) <0.01 ng/mL   EKG 12 Lead   Result Value Ref Range    Ventricular Rate 86 BPM    Atrial Rate 86 BPM    P-R Interval 180 ms    QRS Duration 100 ms    Q-T Interval 382 ms    QTc Calculation (Bazett) 457 ms    P Axis 27 degrees    R Axis -58 degrees    T Axis 97 degrees    Diagnosis       Normal sinus rhythmLeft anterior fascicular blockMinimal voltage criteria for LVH, may be normal variantT wave abnormality, consider lateral ischemiaAbnormal ECGWhen compared with ECG of 26-APR-2022 21:11,Premature ventricular complexes are no longer Present       ECG  The Ekg interpreted by me shows  normal sinus rhythm with a rate of 86  Axis is   Left axis deviation  QTc is  prolonged at 457  Intervals and Durations are unremarkable. ST Segments: nonspecific changes  No significant change from prior EKG dated 4/26/2022    RADIOLOGY  CTA CHEST ABDOMEN PELVIS W CONTRAST   Final Result   1. No acute aortic syndrome. 2. No acute process in the chest, abdomen or pelvis. ED COURSE/MDM  Patient seen and evaluated. Old records reviewed. Labs and imaging reviewed and results discussed with patient.       Is a 72-year-old male, presenting with concerns for sudden onset deep back pain between his shoulder blades that had improved significantly prior to arrival.  She does have a history significant for coronary artery disease, DVT/pulmonary embolism, and aortic aneurysm. Upon arrival in the ED, vitals remarkable for hypertension, otherwise reassuring. Patient is resting comfortably is in no acute distress. He does have reproducible tenderness to palpation on the right side of his back. EKG similar to previous performed 4/26/2022 is no evidence of acute ischemia or dysrhythmias. Had taken 4 aspirin prior to arrival.  Labs were performed and are reassuring. No acute concerning electrolyte abnormalities. No leukocytosis or anemia. Initial troponin was elevated at 0.02, repeat was performed slightly over an hour later was found to be 0.04. CTA chest abdomen pelvis did not reveal any acute aortic pathology and no acute process in the chest abdomen or pelvis. Patient was initiated on a heparin drip for NSTEMI. He has previously been seen at [facility] for his cardiac issues however they would not have a bed available until tomorrow evening at the earliest and patient stated that he would be comfortable going to [facility].  Patient will be transferred for further cardiac evaluation. He is comfortable and in agreement with plan of care. I spoke to Dr. Silvano Toussaint who has agreed to accept the patient. The total Critical Care time is 55 minutes which excludes separately billable procedures.     During the patient's ED course, the patient was given:  Medications   heparin (porcine) injection 4,000 Units (has no administration in time range)   heparin (porcine) injection 4,000 Units (has no administration in time range)   heparin (porcine) injection 2,000 Units (has no administration in time range)   heparin 25,000 units in dextrose 5% 250 mL (premix) infusion (has no administration in time range)   iopamidol (ISOVUE-370) 76 % injection 85 mL (85 mLs IntraVENous Given 4/28/22 1061)   morphine sulfate (PF) injection 4 mg (4 mg IntraVENous Given 4/28/22 9713) ondansetron [facility]) injection 4 mg (4 mg IntraVENous Given 4/28/22 9569)        CLINICAL IMPRESSION  1. NSTEMI (non-ST elevated myocardial infarction) (HCC)        Blood pressure (!) 150/89, pulse 79, temperature 98.4 °F (36.9 °C), temperature source Oral, resp. rate 18, height 5' 10\" (1.778 m), weight (!) 320 lb (145.2 kg), SpO2 94 %. DISPOSITION  Michoacano Guerin was transferred to [facility] in stable condition. Patient was given scripts for the following medications. I counseled patient how to take these medications. New Prescriptions    No medications on file       Follow-up with:  No follow-up provider specified. DISCLAIMER: This chart was created using Dragon dictation software. Efforts were made by me to ensure accuracy, however some errors may be present due to limitations of this technology and occasionally words are not transcribed correctly.        Helene Salcedo MD  04/29/22 4514

## 2022-04-29 NOTE — PROGRESS NOTES
Patient admitted to room 202 from [facility] ED. Patient oriented to room, call light, bed rails, phone, lights and bathroom. Patient instructed about the schedule of the day including: vital sign frequency, lab draws, possible tests, frequency of MD and staff rounds, including RN/MD rounding together at bedside, daily weights, and I &O's. Patient instructed about prescribed diet, how to use 15097, and television. Telemetry box sheet faxed. Admitting MD made aware of pt's arrival. Bed locked, in lowest position, side rails up 2/4, call light within reach. Will continue to monitor.

## 2022-04-29 NOTE — CARE COORDINATION
CASE MANAGEMENT INITIAL ASSESSMENT      Reviewed chart and completed assessment with patient:at bedside  Family present: no  Explained Case Management role/services. yes    Primary contact information:see below    Health Care Decision Maker :   Primary Decision Maker: Lian Mcdermott - [phone]          Can this person be reached and be able to respond quickly, such as within a few minutes or hours? Yes    Admit date/status:04/29/2022  Diagnosis:NSTEMI   Is this a Readmission?:  No      Insurance:BCBS   Precert required for SNF: Yes       3 night stay required: No    Living arrangements, Adls, care needs, prior to admission:Pt lives in a ranch home w/0STE, drives, able to care for ADL's    Durable Medical Equipment at home:  None  Services in the home and/or outpatient, prior to admission:none    Current PCP:Pt has an appointment w/a new PCP May 18                                Medications:needs assistance Prescription coverage? Yes Will pt require financial assistance with medications No     Transportation needs: none     Dialysis Facility (if applicable)   · Name:  · Address:  · Dialysis Schedule:  · Phone:  · Fax:    PT/OT recs:none    Hospital Exemption Notification (HEN):not initiated    Barriers to discharge:none    Plan/comments: Pt plans to d/c home when stable and will have no DCP needs. Pt has an appointment set up w/a new PCP and will need assistance w/scripts from the MD at d/c to bridge him through to his new appointment. MD made aware. Please contact CM should needs arise.  Jose Coffey RN       [facility] on chart for MD signature

## 2022-04-29 NOTE — BRIEF OP NOTE
Brief Postoperative Note      Patient: Caryn Leung  YOB: 1970  MRN: [id number]      Cardiac Cath  Postoperative Note      1. Pre-operative Diagnosis: NSTEMI        Post-operative Diagnosis: Same  2. Surgeons/Assistants: Jacobo Garcia MD, [facility]  3. Complications: None  4. Estimated Blood Loss: less than 50   5. Specimens: Were Not Obtained  6. Anesthesia: Local and Moderate Sedation  For sedation: Moderated sedation was achieved with Versed (6mg) and Fentanyl (200mcg). Monitoring of the patient's vital signs and respiratory status was provided by a trained independent observer nurse during the entire course of the procedure(s) and under my direct supervision and recorded in patient's medical record. The duration of sedation was 1535 to 1642. 7. Procedure(s) performed: Please see Xims chart for more detailed information on any catheter or equipment use. Further details on the procedure, sedation and proceedings of case  Moderate sedation. Left heart cath. Left ventriculogram.  Bilateral coronary angiography. Graft angiography. Balloon angioplasty and PCI to distal RCA        Procedure Details:  Consent Access  site Bleed Risk Sedat US   Used*? Contrast Flouro TIme Fluoro  mGy   Yes  left femoral artery  low MCSFC  yes  240 mL Isovue  15.4  2/1/2006   *CPT 06620: If stated \"yes\", Ultrasound guidance was used to access lt femoral artery using Seldinger technique after local infiltration of 1% lidocaine. Ultrasound(US) documented/visualized size, patency, pulsatility and exact location for puncture and determined ok to proceed. Real-time imaging used for needle entry into the vessel(s). Image was printed off [employer] and sent to medical records on a progress note.    *Sedation Note: MCSFC = minimal conscious sedation for comfort      Left Heart Cath:   Findings   LVEDP  15   LVEF  55%   LV wall motion  normal   Gradient across AV  none   Mitral regurg  no significant seen     Coronary Angiogram: Note patient with extremely large body habitus very difficult to see under fluoroscopy images requiring additional stenting and dye load  Artery Findings/Result   LM  luminal regularities with mild intraluminal calcification   LAD  luminal irregularities. Proximal likely 50% smooth lesion 100% occluded in mid section after the takeoff of a large diagonal branch   LCx  luminal regularities. There is a previously placed stent in the mid circumflex that is widely patent   RI    RCA  dominant. There is moderate diffuse disease throughout the proximal and mid with lesions in the mid reaching likely 50%. Distal RCA has a tight 90% focal napkin ring stenosis with LORENA II flow past this area       Graft Angio     1. LIMA-LAD: Widely patent. There is a moderate size vessel/intercostal branch that is still patent and was not clipped. There is also a very small tiny intercostal branch that is present. LIMA itself is patent yet torturous. Touches down what appears to be the mid LAD with antegrade flow down a small somewhat atretic LAD. There is some retrograde flow up this vessel and appears to flow into 2 small diagonal branches      2. Unable to find any SVG graft off the aorta. Also no competitive flow through the circumflex system seen    Results of intervention     Lesion 1: Distal RCA  Stent: Xience Shonda 2.5 x 12 mm drug-eluting stent  Pre:   95% stenosis, LORENA 2 flow  Post: 0% stenosis, LORENA 3 flow        Assessment/Plan  1. Successful percutaneous intervention to the distal RCA culprit lesion using a Xience Shonda drug-eluting stent. 2.  Interval apparent loss of SVG to OM graft compared to last cath  3. Continue aspirin 81 mg daily, Plavix 85 mg for 1 year minimum, given extent of disease would continue longer as tolerated  4. Integrilin x6 hours as tolerated. 5.  High-dose statin and beta-blocker  6.   Of note we will watch creatinine patient body habitus required extra dye and radiation  7. Also unable to access to right femoral artery and there appeared to be under ultrasound occlusion at the femoral artery area.   Will defer to outpatient work-up     patient to follow-up with Dr. Ronaldo Villalta          Electronically signed by Leeroy Valencia MD on 4/29/2022 at 4:48 PM

## 2022-04-30 VITALS
DIASTOLIC BLOOD PRESSURE: 70 MMHG | WEIGHT: 315 LBS | SYSTOLIC BLOOD PRESSURE: 132 MMHG | BODY MASS INDEX: 45.1 KG/M2 | HEIGHT: 70 IN | RESPIRATION RATE: 14 BRPM | HEART RATE: 81 BPM | TEMPERATURE: 97.9 F | OXYGEN SATURATION: 96 %

## 2022-04-30 LAB
ANION GAP SERPL CALCULATED.3IONS-SCNC: 7 MMOL/L (ref 3–16)
BUN BLDV-MCNC: 7 MG/DL (ref 7–20)
CALCIUM SERPL-MCNC: 9.6 MG/DL (ref 8.3–10.6)
CHLORIDE BLD-SCNC: 101 MMOL/L (ref 99–110)
CO2: 30 MMOL/L (ref 21–32)
CREAT SERPL-MCNC: 0.8 MG/DL (ref 0.9–1.3)
EKG ATRIAL RATE: 69 BPM
EKG ATRIAL RATE: 70 BPM
EKG DIAGNOSIS: NORMAL
EKG DIAGNOSIS: NORMAL
EKG P AXIS: 41 DEGREES
EKG P AXIS: 44 DEGREES
EKG P-R INTERVAL: 186 MS
EKG P-R INTERVAL: 192 MS
EKG Q-T INTERVAL: 404 MS
EKG Q-T INTERVAL: 420 MS
EKG QRS DURATION: 104 MS
EKG QRS DURATION: 96 MS
EKG QTC CALCULATION (BAZETT): 432 MS
EKG QTC CALCULATION (BAZETT): 453 MS
EKG R AXIS: -56 DEGREES
EKG R AXIS: -57 DEGREES
EKG T AXIS: 60 DEGREES
EKG T AXIS: 9 DEGREES
EKG VENTRICULAR RATE: 69 BPM
EKG VENTRICULAR RATE: 70 BPM
GFR AFRICAN AMERICAN: >60
GFR NON-AFRICAN AMERICAN: >60
GLUCOSE BLD-MCNC: 104 MG/DL (ref 70–99)
HCT VFR BLD CALC: 41.4 % (ref 40.5–52.5)
HEMOGLOBIN: 13.9 G/DL (ref 13.5–17.5)
MCH RBC QN AUTO: 30.5 PG (ref 26–34)
MCHC RBC AUTO-ENTMCNC: 33.7 G/DL (ref 31–36)
MCV RBC AUTO: 90.6 FL (ref 80–100)
PDW BLD-RTO: 15 % (ref 12.4–15.4)
PLATELET # BLD: 172 K/UL (ref 135–450)
PMV BLD AUTO: 8.7 FL (ref 5–10.5)
POTASSIUM REFLEX MAGNESIUM: 3.7 MMOL/L (ref 3.5–5.1)
POTASSIUM SERPL-SCNC: 3.7 MMOL/L (ref 3.5–5.1)
RBC # BLD: 4.57 M/UL (ref 4.2–5.9)
SODIUM BLD-SCNC: 138 MMOL/L (ref 136–145)
WBC # BLD: 5.1 K/UL (ref 4–11)

## 2022-04-30 PROCEDURE — 6370000000 HC RX 637 (ALT 250 FOR IP): Performed by: INTERNAL MEDICINE

## 2022-04-30 PROCEDURE — 36415 COLL VENOUS BLD VENIPUNCTURE: CPT

## 2022-04-30 PROCEDURE — 85027 COMPLETE CBC AUTOMATED: CPT

## 2022-04-30 PROCEDURE — 2580000003 HC RX 258: Performed by: INTERNAL MEDICINE

## 2022-04-30 PROCEDURE — 93005 ELECTROCARDIOGRAM TRACING: CPT | Performed by: INTERNAL MEDICINE

## 2022-04-30 PROCEDURE — 93010 ELECTROCARDIOGRAM REPORT: CPT | Performed by: INTERNAL MEDICINE

## 2022-04-30 PROCEDURE — 80048 BASIC METABOLIC PNL TOTAL CA: CPT

## 2022-04-30 PROCEDURE — 83036 HEMOGLOBIN GLYCOSYLATED A1C: CPT

## 2022-04-30 PROCEDURE — 99232 SBSQ HOSP IP/OBS MODERATE 35: CPT | Performed by: NURSE PRACTITIONER

## 2022-04-30 RX ORDER — LISINOPRIL 20 MG/1
20 TABLET ORAL DAILY
Qty: 90 TABLET | Refills: 3 | Status: SHIPPED | OUTPATIENT
Start: 2022-04-30

## 2022-04-30 RX ORDER — ASPIRIN 81 MG/1
81 TABLET, CHEWABLE ORAL DAILY
Qty: 30 TABLET | Refills: 3 | Status: SHIPPED | OUTPATIENT
Start: 2022-04-30

## 2022-04-30 RX ORDER — ATORVASTATIN CALCIUM 80 MG/1
80 TABLET, FILM COATED ORAL NIGHTLY
Qty: 30 TABLET | Refills: 3 | Status: SHIPPED | OUTPATIENT
Start: 2022-04-30

## 2022-04-30 RX ORDER — ATORVASTATIN CALCIUM 80 MG/1
80 TABLET, FILM COATED ORAL NIGHTLY
Qty: 30 TABLET | Refills: 3 | Status: SHIPPED | OUTPATIENT
Start: 2022-04-30 | End: 2022-04-30

## 2022-04-30 RX ORDER — CLOPIDOGREL BISULFATE 75 MG/1
75 TABLET ORAL DAILY
Qty: 90 TABLET | Refills: 1 | Status: SHIPPED | OUTPATIENT
Start: 2022-04-30 | End: 2022-04-30 | Stop reason: SDUPTHER

## 2022-04-30 RX ORDER — CLOPIDOGREL BISULFATE 75 MG/1
75 TABLET ORAL DAILY
Qty: 90 TABLET | Refills: 1 | Status: SHIPPED | OUTPATIENT
Start: 2022-04-30

## 2022-04-30 RX ADMIN — Medication 10 ML: at 09:25

## 2022-04-30 RX ADMIN — LISINOPRIL 20 MG: 20 TABLET ORAL at 09:24

## 2022-04-30 RX ADMIN — CLOPIDOGREL BISULFATE 75 MG: 75 TABLET, FILM COATED ORAL at 09:24

## 2022-04-30 RX ADMIN — PANTOPRAZOLE SODIUM 40 MG: 40 TABLET, DELAYED RELEASE ORAL at 06:17

## 2022-04-30 RX ADMIN — ASPIRIN 81 MG 81 MG: 81 TABLET ORAL at 09:24

## 2022-04-30 RX ADMIN — METOPROLOL TARTRATE 25 MG: 25 TABLET, FILM COATED ORAL at 09:24

## 2022-04-30 NOTE — PROGRESS NOTES
Rn Paged on call cardiology for clarity on Integrilin medication order. Dr. Quique Ahn verified Integrilin was to only be infused for 6 hours.

## 2022-04-30 NOTE — DISCHARGE SUMMARY
Hospital Medicine Discharge Summary    Patient ID: Anibal Roldan      Patient's PCP: No primary care provider on file. Admit Date: 4/29/2022     Discharge Date: 4/30/2022      Admitting Provider: Lui Booth MD     Discharge Provider: NILS Garcia CNP     Discharge Diagnoses: Active Hospital Problems    Diagnosis     NSTEMI (non-ST elevated myocardial infarction) ([facility] [address] [I21.4]        The patient was seen and examined on day of discharge and this discharge summary is in conjunction with any daily progress note from day of discharge. Hospital Course:     49 y.o. male with a PMH of CAD s/p CABG '17, HLD, GERD, Obesity, PE/DVT, Anxiety/Depression and HTN who presented to [facility] with a complaint of back pain. He reports a recent hx of URI and possible thoracic strain which was evaluated in ED over the last couple weeks. He completed abx. He states acute onset of back pain between shoulder blades. He denies N/V/D/F/C. Patient evaluated at [facility] ED. CTA chest abdomen pelvis did not reveal any acute aortic pathology and no acute process in the chest abdomen or pelvis. Troponin 0.02->0. 04. BNP 66, Patient was initiated on a heparin drip for NSTEMI and subsequently transferred to [facility].      NSTEMI  -Trop uptrending  -Cardiology consult  -Hep gtt  -ASA, Statin, Plavix, BB  -A1C 6.1  -S/P CABG 2014  -PCI/ADELIA to 80% Cx lesion 7/22/2021  -CT:No acute aortic syndrome. No acute process in the chest, abdomen or pelvis  LHC-Distal RCA lesion ADELIA 4/29     HLD-Statin, lipid panel     HTN-BB/Lisinopril     GERD-PPI     Obesity body mass index is 44.99 kg/m². Complicating assessment and treatment. Placing patient at risk for multiple co-morbidities as well as early death and contributing to the patient's presentation. Counseled on weight loss     Continue DAPT, follow up with Cardiology, No cp/cob, Outpatient evaluation for PVD.       Physical Exam Performed:     /70   Pulse 81   Temp 97.9 °F (36.6 °C) (Oral)   Resp 14   Ht 5' 10\" (1.778 m)   Wt (!) 328 lb 8 oz (149 kg)   SpO2 96%   BMI 47.13 kg/m²       General appearance:  Obese. No apparent distress, appears stated age and cooperative. HEENT:  Normal cephalic, atraumatic without obvious deformity. Pupils equal, round, and reactive to light. Extra ocular muscles intact. Conjunctivae/corneas clear. Neck: Supple, with full range of motion. No jugular venous distention. Trachea midline. Respiratory:  Normal respiratory effort. Clear to auscultation, bilaterally without Rales/Wheezes/Rhonchi. Cardiovascular:  Regular rate and rhythm with normal S1/S2 without murmurs, rubs or gallops. Abdomen: Soft, non-tender, non-distended with normal bowel sounds. Musculoskeletal:  No clubbing, cyanosis or edema bilaterally. Full range of motion without deformity. Skin: Skin color, texture, turgor normal.  No rashes or lesions. Neurologic:  Neurovascularly intact without any focal sensory/motor deficits. Cranial nerves: II-XII intact, grossly non-focal.  Psychiatric:  Alert and oriented, thought content appropriate, normal insight  Capillary Refill: Brisk,3 seconds, normal  Peripheral Pulses: +2 palpable, equal bilaterally       Labs:  For convenience and continuity at follow-up the following most recent labs are provided:      CBC:    Lab Results   Component Value Date    WBC 5.1 04/30/2022    HGB 13.9 04/30/2022    HCT 41.4 04/30/2022     04/30/2022       Renal:    Lab Results   Component Value Date     04/30/2022    K 3.7 04/30/2022    K 3.7 04/30/2022     04/30/2022    CO2 30 04/30/2022    BUN 7 04/30/2022    CREATININE 0.8 04/30/2022    CALCIUM 9.6 04/30/2022         Significant Diagnostic Studies    Radiology:   No orders to display          Consults:     IP CONSULT TO CARDIOLOGY  IP CONSULT TO PHARMACY  IP CONSULT TO CARDIAC REHAB  IP CONSULT TO CARDIAC REHAB    Disposition:  Home    Condition at Discharge: Stable    Discharge Instructions/Follow-up:  See AVS    Code Status:  Prior     Activity: activity as tolerated    Diet: cardiac diet      Discharge Medications:     Discharge Medication List as of 4/30/2022  3:27 PM           Details   aspirin 81 MG chewable tablet Take 1 tablet by mouth daily, Disp-30 tablet, R-3Normal      metoprolol tartrate (LOPRESSOR) 25 MG tablet Take 1 tablet by mouth 2 times daily, Disp-60 tablet, R-1Normal      atorvastatin (LIPITOR) 80 MG tablet Take 1 tablet by mouth nightly, Disp-30 tablet, R-3Normal      clopidogrel (PLAVIX) 75 MG tablet Take 1 tablet by mouth daily, Disp-90 tablet, R-1Normal      lisinopril (PRINIVIL;ZESTRIL) 20 MG tablet Take 1 tablet by mouth daily, Disp-90 tablet, R-3Normal              Details   cyclobenzaprine (FLEXERIL) 5 MG tablet Take 1 tablet by mouth 2 times daily as needed for Muscle spasms, Disp-14 tablet, R-0Normal      omeprazole (PRILOSEC) 40 MG delayed release capsule Take 40 mg by mouth dailyHistorical Med      albuterol sulfate HFA (VENTOLIN HFA) 108 (90 Base) MCG/ACT inhaler Inhale 2 puffs into the lungs 4 times daily as needed for Wheezing, Disp-18 g, R-0Normal             Time Spent on discharge is more than 30 minutes in the examination, evaluation, counseling and review of medications and discharge plan. Signed:    NILS Bell CNP   5/21/2022      Thank you No primary care provider on file. for the opportunity to be involved in this patient's care. If you have any questions or concerns, please feel free to contact me at [phone].

## 2022-04-30 NOTE — PROGRESS NOTES
[address]  Cardiology  Progress Note    Admission date:  2022    Reason for follow up visit: NSTEMI    HPI/CC: Michoacano Guerin is a 49 y.o. male who was admitted 2022 for chest pain, troponin elevated. LHC showed RCA lesion treated with ADELIA. Rhythm has been sinus. Subjective: Denies chest pain, shortness of breath, palpitations and dizziness. Feels better than he has in a long time. Vitals:  Blood pressure 116/72, pulse 78, temperature 98 °F (36.7 °C), temperature source Oral, resp. rate 12, height 5' 10\" (1.778 m), weight (!) 328 lb 8 oz (149 kg), SpO2 94 %.   Temp  Av.3 °F (36.8 °C)  Min: 98 °F (36.7 °C)  Max: 98.6 °F (37 °C)  Pulse  Av.5  Min: 59  Max: 78  BP  Min: 116/72  Max: 144/86  SpO2  Av.5 %  Min: 94 %  Max: 95 %    24 hour I/O    Intake/Output Summary (Last 24 hours) at 2022 1311  Last data filed at 2022 7888  Gross per 24 hour   Intake --   Output 700 ml   Net -700 ml     Current Facility-Administered Medications   Medication Dose Route Frequency Provider Last Rate Last Admin    clopidogrel (PLAVIX) tablet 75 mg  75 mg Oral Daily Antonieta Luther MD   75 mg at 22 2351    cyclobenzaprine (FLEXERIL) tablet 5 mg  5 mg Oral BID PRN Antonieta Luther MD        lisinopril (PRINIVIL;ZESTRIL) tablet 20 mg  20 mg Oral Daily Antonieta Luther MD   20 mg at 22 0924    metoprolol tartrate (LOPRESSOR) tablet 25 mg  25 mg Oral BID Antonieta Luther MD   25 mg at 22 0924    pantoprazole (PROTONIX) tablet 40 mg  40 mg Oral QAM AC Antonieta Luther MD   40 mg at 22 0617    sodium chloride flush 0.9 % injection 5-40 mL  5-40 mL IntraVENous 2 times per day Antonieta Luther MD   10 mL at 22 0925    sodium chloride flush 0.9 % injection 5-40 mL  5-40 mL IntraVENous PRN Antonieta Luther MD        0.9 % sodium chloride infusion   IntraVENous PRN Antonieta Luther MD        ondansetron (ZOFRAN-ODT) disintegrating tablet 4 mg  4 mg Oral Q8H PRN [facility] Jenaro Fernandez MD        Or    ondansetron [facility]) injection 4 mg  4 mg IntraVENous Q6H PRN Malik Edwards MD        acetaminophen (TYLENOL) tablet 650 mg  650 mg Oral Q6H PRN Malik Edwards MD        Or    acetaminophen (TYLENOL) suppository 650 mg  650 mg Rectal Q6H PRN Malik Edwards MD        polyethylene glycol [facility]) packet 17 g  17 g Oral Daily PRN Malik Edwards MD        atorvastatin (LIPITOR) tablet 80 mg  80 mg Oral Nightly Malik Edwards MD   80 mg at 04/29/22 2015    perflutren lipid microspheres (DEFINITY) injection 1.65 mg  1.5 mL IntraVENous ONCE PRN Malik Edwards MD        aspirin chewable tablet 81 mg  81 mg Oral Daily Malik Edwards MD   81 mg at 04/30/22 4252    eptifibatide (INTEGRILIN) 0.75 mg/mL infusion  15 mg/hr IntraVENous Continuous Malik Edwards MD   Stopped at 04/29/22 2330    sodium chloride flush 0.9 % injection 5-40 mL  5-40 mL IntraVENous 2 times per day Malik Edwards MD   10 mL at 04/30/22 0925    sodium chloride flush 0.9 % injection 5-40 mL  5-40 mL IntraVENous PRN Malik Edwards MD        0.9 % sodium chloride infusion   IntraVENous PRN Malik Edwards MD        traMADol Kina Horn) tablet 50 mg  50 mg Oral Q6H PRN Liddie Scale, APRN - CNP   50 mg at 04/29/22 2015       Review of Systems    Objective:     Telemetry monitor: SR    Physical Exam:  Constitutional:  Comfortable and alert, NAD, appears older than stated age  Eyes: PERRL, sclera nonicteric  Neck:  Supple, no masses, no thyroidmegaly, JVD difficult to assess  Skin:  Warm and dry; no rash or lesions  Heart: Regular, normal apex, S1 and S2 normal, no M/G/R  Lungs:  Normal respiratory effort; clear; no wheezing/rhonchi/rales  Abdomen: soft, non tender, + bowel sounds  Extremities:  No edema or cyanosis; no clubbing  Neuro: alert and oriented, moves legs and arms equally, normal mood and affect  Left femoral site soft, no hematoma, 2+ L femoral pulse, 2+ L DP/PT pulse    Data Reviewed:    Coronary angiogram 4/29/2022:    Findings   LVEDP  15   LVEF  55%   LV wall motion  normal   Gradient across AV  none   Mitral regurg  no significant seen    Coronary Angiogram: Note patient with extremely large body habitus very difficult to see under fluoroscopy images requiring additional stenting and dye load  Artery Findings/Result   LM  luminal regularities with mild intraluminal calcification   LAD  luminal irregularities. Proximal likely 50% smooth lesion 100% occluded in mid section after the takeoff of a large diagonal branch   LCx  luminal regularities. There is a previously placed stent in the mid circumflex that is widely patent   RI     RCA  dominant. There is moderate diffuse disease throughout the proximal and mid with lesions in the mid reaching likely 50%. Distal RCA has a tight 90% focal napkin ring stenosis with LORENA II flow past this area     Graft Angio    1. LIMA-LAD: Widely patent. There is a moderate size vessel/intercostal branch that is still patent and was not clipped. There is also a very small tiny intercostal branch that is present. LIMA itself is patent yet torturous. Touches down what appears to be the mid LAD with antegrade flow down a small somewhat atretic LAD. There is some retrograde flow up this vessel and appears to flow into 2 small diagonal branches  2. Unable to find any SVG graft off the aorta. Also no competitive flow through the circumflex system seen  Results of intervention    Lesion 1: Distal RCA  Stent: Xience Shonda 2.5 x 12 mm drug-eluting stent  Pre:   95% stenosis, LORENA 2 flow  Post: 0% stenosis, LORENA 3 flow   Assessment/Plan  1. Successful percutaneous intervention to the distal RCA culprit lesion using a Xience Shonda drug-eluting stent. 2.  Interval apparent loss of SVG to OM graft compared to last cath  3. Continue aspirin 81 mg daily, Plavix 85 mg for 1 year minimum, given extent of disease would continue longer as tolerated  4.   Integrilin x6 hours as tolerated. 5.  High-dose statin and beta-blocker  6. Of note we will watch creatinine patient body habitus required extra dye and radiation  7. Also unable to access to right femoral artery and there appeared to be under ultrasound occlusion at the femoral artery area. Will defer to outpatient work-up   patient to follow-up with Dr. Azul Munoz    Echo 2014:  Normal left ventricular size and wall thickness. Normal left ventricular   systolic function with an estimated ejection fraction of 50-55% .   No regional wall motion abnormalities.   Normal right ventricular size and function.     Lab Reviewed:     Renal Profile:  Lab Results   Component Value Date    CREATININE 0.8 04/30/2022    BUN 7 04/30/2022     04/30/2022    K 3.7 04/30/2022    K 3.7 04/30/2022     04/30/2022    CO2 30 04/30/2022     CBC:    Lab Results   Component Value Date    WBC 5.1 04/30/2022    RBC 4.57 04/30/2022    HGB 13.9 04/30/2022    HCT 41.4 04/30/2022    MCV 90.6 04/30/2022    RDW 15.0 04/30/2022     04/30/2022     BNP:    Lab Results   Component Value Date    PROBNP 66 04/13/2022     Fasting Lipid Panel:    Lab Results   Component Value Date    CHOL 201 04/29/2022    HDL 31 04/29/2022    TRIG 170 04/29/2022     Cardiac Enzymes:  CK/MbTroponin  Lab Results   Component Value Date    TROPONINI 0.23 04/29/2022     PT/ INR   Lab Results   Component Value Date    INR 1.21 04/29/2022    INR 1.19 01/22/2022    INR 1.21 01/06/2016    PROTIME 13.8 04/29/2022    PROTIME 13.6 01/22/2022    PROTIME 13.8 01/06/2016     PTT No results found for: PTT   Lab Results   Component Value Date    MG 2.10 08/17/2014      Lab Results   Component Value Date    TSH 0.95 08/25/2014     All labs and imaging reviewed today    Assessment:  NSTEMI/CAD: angina resolved; s/p ADELIA RCA 4/29/2022; s/p CABG x2 2014; s/p PCI 2006 and 2008   - LIMA-LAD patent but appears interval closure of SVG-OM  Possible PVD: unable to access R CFA  HTN  HLD  History of PE/DVT    Plan:   1. Activity restrictions reviewed  2. Continue DAPT indefinitely given extensive CAD  3. Echo not completed today. He can complete as outpatient. EF 55% on LV gram  4. Continue aspirin, statin, plavix, metoprolol, lisinopril  5. Outpatient evaluation for PVD  6. Ok to discharge from cardiology perspective, please call with any questions.  He will follow up with Dr. Roosevelt Brink, [address]  [phone]

## 2022-05-01 LAB
ESTIMATED AVERAGE GLUCOSE: 128.4 MG/DL
HBA1C MFR BLD: 6.1 %

## 2022-05-02 ENCOUNTER — TELEPHONE (OUTPATIENT)
Dept: CARDIOLOGY CLINIC | Age: 52
End: 2022-05-02

## 2022-05-02 LAB
POC ACT LR: 144 SEC
POC ACT LR: 267 SEC

## 2022-05-02 NOTE — LETTER
Tung Gallegos  1970    Dear Johanne Dickerson    I have been trying to reach you by phone and unable to get in contact with you. Would you please contact our office to schedule a follow up appointment with Dr Helene Curry.         [facility]  [address]  [place] [address]  [phone]    Lino Amor MD/Kacey Real RN

## 2022-05-02 NOTE — TELEPHONE ENCOUNTER
----- Message from Michele Tapia MD sent at 5/1/2022 12:06 PM EDT -----  Let patient know their hga1c test is slightly higher, rec: f/u w/ pcp for this, lets forward labs to pcp. Thanks.

## 2022-05-02 NOTE — TELEPHONE ENCOUNTER
Spoke with pt relayed message per JP. He has a PCP appt on 5/18, he does not know which doctor, but he will have their office contact ours for the results. Pt would like [facility] to review cholesterol labs and advise. They are in epic 4/29/22.

## 2022-05-02 NOTE — TELEPHONE ENCOUNTER
Let patient know their lipid test is mildly elevated, consider alt statin if on lipitor. Rec: crestor 40mg po qhs if currently on lipitor 80mg po qhs. Lets make that switch if pt agreeable. Get f/u lipids/lfts in 1-2mo. Thanks. No

## 2022-05-02 NOTE — TELEPHONE ENCOUNTER
Attempted to call patient to get scheduled to see Dr Cory Aceves for hospital follow up. Unable to leave message. Will try again at a later time.

## 2022-05-02 NOTE — LETTER
[address] Cardiology - [address] [facility] [address] [facility]  Phone: [phone]  Fax: [phone]    Yair Van MD        May 5, 2022    [address]ance  [address] [facility] [address]      Dear Nancy Monterroso:      [facility] to contact you several times. However we have been unsuccessful. Please contact the office at you earliest convenience. We would like to relay a message from your provider.          Sincerely,        Yair Van MD

## 2022-05-09 NOTE — OP NOTE
Postoperative Note      Patient: Jaun Severe  YOB: 1970  MRN: [id number]      Cardiac Cath  Postoperative Note      1. Pre-operative Diagnosis: NSTEMI        Post-operative Diagnosis: Same  2. Surgeons/Assistants: Linda Triana MD, [facility]  3. Complications: None  4. Estimated Blood Loss: less than 50   5. Specimens: Were Not Obtained  6. Anesthesia: Local and Moderate Sedation  For sedation: Moderated sedation was achieved with Versed (6mg) and Fentanyl (200mcg). Monitoring of the patient's vital signs and respiratory status was provided by a trained independent observer nurse during the entire course of the procedure(s) and under my direct supervision and recorded in patient's medical record. The duration of sedation was 1535 to 1642. 7. Procedure(s) performed: Please see Xims chart for more detailed information on any catheter or equipment use. Further details on the procedure, sedation and proceedings of case  Moderate sedation. Left heart cath. Left ventriculogram.  Bilateral coronary angiography. Graft angiography. Balloon angioplasty and PCI to distal RCA        Procedure Details:  Consent Access  site Bleed Risk Sedat US   Used*? Contrast Flouro TIme Fluoro  mGy   Yes  left femoral artery  low MCSFC  yes  240 mL Isovue  15.4  2/1/2006   *CPT 60714: If stated \"yes\", Ultrasound guidance was used to access lt femoral artery using Seldinger technique after local infiltration of 1% lidocaine. Ultrasound(US) documented/visualized size, patency, pulsatility and exact location for puncture and determined ok to proceed. Real-time imaging used for needle entry into the vessel(s). Image was printed off [employer] and sent to medical records on a progress note.    *Sedation Note: MCSFC = minimal conscious sedation for comfort      Left Heart Cath:   Findings   LVEDP  15   LVEF  55%   LV wall motion  normal   Gradient across AV  none   Mitral regurg  no significant seen     Coronary Angiogram: Note patient with extremely large body habitus very difficult to see under fluoroscopy images requiring additional stenting and dye load  Artery Findings/Result   LM  luminal regularities with mild intraluminal calcification   LAD  luminal irregularities. Proximal likely 50% smooth lesion 100% occluded in mid section after the takeoff of a large diagonal branch   LCx  luminal regularities. There is a previously placed stent in the mid circumflex that is widely patent   RI    RCA  dominant. There is moderate diffuse disease throughout the proximal and mid with lesions in the mid reaching likely 50%. Distal RCA has a tight 90% focal napkin ring stenosis with LORENA II flow past this area       Graft Angio     1. LIMA-LAD: Widely patent. There is a moderate size vessel/intercostal branch that is still patent and was not clipped. There is also a very small tiny intercostal branch that is present. LIMA itself is patent yet torturous. Touches down what appears to be the mid LAD with antegrade flow down a small somewhat atretic LAD. There is some retrograde flow up this vessel and appears to flow into 2 small diagonal branches      2. Unable to find any SVG graft off the aorta. Also no competitive flow through the circumflex system seen    Results of intervention     Lesion 1: Distal RCA  Stent: Xience Shonda 2.5 x 12 mm drug-eluting stent  Pre:   95% stenosis, LORENA 2 flow  Post: 0% stenosis, LORENA 3 flow        Assessment/Plan  1. Successful percutaneous intervention to the distal RCA culprit lesion using a Xience Shonda drug-eluting stent. 2.  Interval apparent loss of SVG to OM graft compared to last cath  3. Continue aspirin 81 mg daily, Plavix 85 mg for 1 year minimum, given extent of disease would continue longer as tolerated  4. Integrilin x6 hours as tolerated. 5.  High-dose statin and beta-blocker  6.   Of note we will watch creatinine patient body habitus required extra dye and radiation  7. Also unable to access to right femoral artery and there appeared to be under ultrasound occlusion at the femoral artery area.   Will defer to outpatient work-up     patient to follow-up with Dr. Marine Roman          Electronically signed by Lesli Lee MD on 5/9/2022 at 10:46 AM

## 2022-05-10 NOTE — TELEPHONE ENCOUNTER
Attempted to call patient and unable to leave message. Letter mailed asking patient to contact the office.

## 2022-06-05 ENCOUNTER — APPOINTMENT (OUTPATIENT)
Dept: GENERAL RADIOLOGY | Age: 52
End: 2022-06-05
Payer: COMMERCIAL

## 2022-06-05 ENCOUNTER — HOSPITAL ENCOUNTER (EMERGENCY)
Age: 52
Discharge: HOME | End: 2022-06-05
Attending: EMERGENCY MEDICINE
Payer: COMMERCIAL

## 2022-06-05 ENCOUNTER — APPOINTMENT (OUTPATIENT)
Dept: CT IMAGING | Age: 52
End: 2022-06-05
Payer: COMMERCIAL

## 2022-06-05 VITALS
OXYGEN SATURATION: 100 % | HEIGHT: 70 IN | SYSTOLIC BLOOD PRESSURE: 151 MMHG | WEIGHT: 315 LBS | BODY MASS INDEX: 45.1 KG/M2 | DIASTOLIC BLOOD PRESSURE: 92 MMHG | TEMPERATURE: 98.7 F | RESPIRATION RATE: 18 BRPM | HEART RATE: 53 BPM

## 2022-06-05 DIAGNOSIS — K57.90 DIVERTICULOSIS: ICD-10-CM

## 2022-06-05 DIAGNOSIS — R10.9 ABDOMINAL PAIN, UNSPECIFIED ABDOMINAL LOCATION: Primary | ICD-10-CM

## 2022-06-05 LAB
A/G RATIO: 1.3 (ref 1.1–2.2)
ALBUMIN SERPL-MCNC: 4 G/DL (ref 3.4–5)
ALP BLD-CCNC: 129 U/L (ref 40–129)
ALT SERPL-CCNC: 28 U/L (ref 10–40)
ANION GAP SERPL CALCULATED.3IONS-SCNC: 8 MMOL/L (ref 3–16)
AST SERPL-CCNC: 24 U/L (ref 15–37)
BASOPHILS ABSOLUTE: 0.1 K/UL (ref 0–0.2)
BASOPHILS RELATIVE PERCENT: 1.1 %
BILIRUB SERPL-MCNC: 0.3 MG/DL (ref 0–1)
BUN BLDV-MCNC: 13 MG/DL (ref 7–20)
CALCIUM SERPL-MCNC: 9.1 MG/DL (ref 8.3–10.6)
CHLORIDE BLD-SCNC: 103 MMOL/L (ref 99–110)
CO2: 23 MMOL/L (ref 21–32)
CREAT SERPL-MCNC: 0.7 MG/DL (ref 0.9–1.3)
EKG ATRIAL RATE: 56 BPM
EKG ATRIAL RATE: 65 BPM
EKG DIAGNOSIS: NORMAL
EKG DIAGNOSIS: NORMAL
EKG P AXIS: 24 DEGREES
EKG P AXIS: 59 DEGREES
EKG P-R INTERVAL: 178 MS
EKG P-R INTERVAL: 186 MS
EKG Q-T INTERVAL: 396 MS
EKG Q-T INTERVAL: 414 MS
EKG QRS DURATION: 104 MS
EKG QRS DURATION: 92 MS
EKG QTC CALCULATION (BAZETT): 399 MS
EKG QTC CALCULATION (BAZETT): 411 MS
EKG R AXIS: -46 DEGREES
EKG R AXIS: -50 DEGREES
EKG T AXIS: -53 DEGREES
EKG T AXIS: 65 DEGREES
EKG VENTRICULAR RATE: 56 BPM
EKG VENTRICULAR RATE: 65 BPM
EOSINOPHILS ABSOLUTE: 0.2 K/UL (ref 0–0.6)
EOSINOPHILS RELATIVE PERCENT: 3.5 %
GFR AFRICAN AMERICAN: >60
GFR NON-AFRICAN AMERICAN: >60
GLUCOSE BLD-MCNC: 130 MG/DL (ref 70–99)
HCT VFR BLD CALC: 42.4 % (ref 40.5–52.5)
HEMOGLOBIN: 14 G/DL (ref 13.5–17.5)
LIPASE: 19 U/L (ref 13–60)
LYMPHOCYTES ABSOLUTE: 1.1 K/UL (ref 1–5.1)
LYMPHOCYTES RELATIVE PERCENT: 16.1 %
MCH RBC QN AUTO: 29.8 PG (ref 26–34)
MCHC RBC AUTO-ENTMCNC: 33.1 G/DL (ref 31–36)
MCV RBC AUTO: 90.2 FL (ref 80–100)
MONOCYTES ABSOLUTE: 0.6 K/UL (ref 0–1.3)
MONOCYTES RELATIVE PERCENT: 7.9 %
NEUTROPHILS ABSOLUTE: 5.1 K/UL (ref 1.7–7.7)
NEUTROPHILS RELATIVE PERCENT: 71.4 %
PDW BLD-RTO: 14.2 % (ref 12.4–15.4)
PLATELET # BLD: 185 K/UL (ref 135–450)
PMV BLD AUTO: 8.6 FL (ref 5–10.5)
POTASSIUM REFLEX MAGNESIUM: 4.6 MMOL/L (ref 3.5–5.1)
RBC # BLD: 4.7 M/UL (ref 4.2–5.9)
SODIUM BLD-SCNC: 134 MMOL/L (ref 136–145)
TOTAL PROTEIN: 7 G/DL (ref 6.4–8.2)
TROPONIN: <0.01 NG/ML
TROPONIN: <0.01 NG/ML
WBC # BLD: 7.1 K/UL (ref 4–11)

## 2022-06-05 PROCEDURE — 2580000003 HC RX 258: Performed by: EMERGENCY MEDICINE

## 2022-06-05 PROCEDURE — 71045 X-RAY EXAM CHEST 1 VIEW: CPT

## 2022-06-05 PROCEDURE — 74177 CT ABD & PELVIS W/CONTRAST: CPT

## 2022-06-05 PROCEDURE — 96361 HYDRATE IV INFUSION ADD-ON: CPT

## 2022-06-05 PROCEDURE — 93005 ELECTROCARDIOGRAM TRACING: CPT | Performed by: EMERGENCY MEDICINE

## 2022-06-05 PROCEDURE — 80053 COMPREHEN METABOLIC PANEL: CPT

## 2022-06-05 PROCEDURE — 96360 HYDRATION IV INFUSION INIT: CPT

## 2022-06-05 PROCEDURE — 6370000000 HC RX 637 (ALT 250 FOR IP): Performed by: EMERGENCY MEDICINE

## 2022-06-05 PROCEDURE — 93010 ELECTROCARDIOGRAM REPORT: CPT | Performed by: INTERNAL MEDICINE

## 2022-06-05 PROCEDURE — 6360000004 HC RX CONTRAST MEDICATION: Performed by: EMERGENCY MEDICINE

## 2022-06-05 PROCEDURE — 36415 COLL VENOUS BLD VENIPUNCTURE: CPT

## 2022-06-05 PROCEDURE — 83690 ASSAY OF LIPASE: CPT

## 2022-06-05 PROCEDURE — 85025 COMPLETE CBC W/AUTO DIFF WBC: CPT

## 2022-06-05 PROCEDURE — 84484 ASSAY OF TROPONIN QUANT: CPT

## 2022-06-05 PROCEDURE — 99285 EMERGENCY DEPT VISIT HI MDM: CPT

## 2022-06-05 RX ORDER — 0.9 % SODIUM CHLORIDE 0.9 %
1000 INTRAVENOUS SOLUTION INTRAVENOUS ONCE
Status: COMPLETED | OUTPATIENT
Start: 2022-06-05 | End: 2022-06-05

## 2022-06-05 RX ORDER — PANTOPRAZOLE SODIUM 40 MG/1
40 TABLET, DELAYED RELEASE ORAL DAILY
Qty: 30 TABLET | Refills: 0 | Status: SHIPPED | OUTPATIENT
Start: 2022-06-05

## 2022-06-05 RX ORDER — ONDANSETRON 4 MG/1
4 TABLET, ORALLY DISINTEGRATING ORAL ONCE
Status: COMPLETED | OUTPATIENT
Start: 2022-06-05 | End: 2022-06-05

## 2022-06-05 RX ADMIN — IOPAMIDOL 75 ML: 755 INJECTION, SOLUTION INTRAVENOUS at 07:59

## 2022-06-05 RX ADMIN — SODIUM CHLORIDE 1000 ML: 9 INJECTION, SOLUTION INTRAVENOUS at 08:11

## 2022-06-05 RX ADMIN — ONDANSETRON 4 MG: 4 TABLET, ORALLY DISINTEGRATING ORAL at 08:11

## 2022-06-05 ASSESSMENT — ENCOUNTER SYMPTOMS
FACIAL SWELLING: 0
BACK PAIN: 0
VOICE CHANGE: 0
STRIDOR: 0
PHOTOPHOBIA: 0
NAUSEA: 1
BLOOD IN STOOL: 0
TROUBLE SWALLOWING: 0
COLOR CHANGE: 0
ABDOMINAL PAIN: 1
VOMITING: 0
WHEEZING: 0
SHORTNESS OF BREATH: 0

## 2022-06-05 ASSESSMENT — PAIN DESCRIPTION - ORIENTATION: ORIENTATION: RIGHT

## 2022-06-05 ASSESSMENT — PAIN SCALES - GENERAL: PAINLEVEL_OUTOF10: 6

## 2022-06-05 ASSESSMENT — PAIN - FUNCTIONAL ASSESSMENT: PAIN_FUNCTIONAL_ASSESSMENT: 0-10

## 2022-06-05 ASSESSMENT — PAIN DESCRIPTION - LOCATION: LOCATION: ABDOMEN

## 2022-06-05 NOTE — ED PROVIDER NOTES
palpitations. Gastrointestinal: Positive for abdominal pain and nausea. Negative for blood in stool and vomiting. Genitourinary: Negative for difficulty urinating and dysuria. Musculoskeletal: Negative for back pain, gait problem and neck pain. Skin: Negative for color change and wound. Neurological: Negative for seizures, syncope and speech difficulty. Psychiatric/Behavioral: Negative for self-injury and suicidal ideas. Except as noted above the remainder of the review of systems was reviewed and negative. PAST MEDICAL HISTORY     Past Medical History:   Diagnosis Date    CAD (coronary artery disease)     stent 2006 & 2008, balloon angioplasty in 2010, S/P NSTEMI 8/19/14 post CABG-->Cath patent grafts, s/p LCx ADELIA.     H/O coronary artery bypass surgery     s/p CABG X2 LIMA-LAD, SVG-OM.  Echo 8/2014 LVEF 50-55%    History of tobacco use     quit smoking     HLD (hyperlipidemia)     Hypertension     Controlled    PE (pulmonary embolism)     and DVT history         SURGICAL HISTORY       Past Surgical History:   Procedure Laterality Date    CARPAL TUNNEL RELEASE      CORONARY ANGIOPLASTY WITH STENT PLACEMENT      CORONARY ARTERY BYPASS GRAFT  8-    CABG x 2    HX VASCULAR STENT           CURRENT MEDICATIONS       Previous Medications    ALBUTEROL SULFATE HFA (VENTOLIN HFA) 108 (90 BASE) MCG/ACT INHALER    Inhale 2 puffs into the lungs 4 times daily as needed for Wheezing    ASPIRIN 81 MG CHEWABLE TABLET    Take 1 tablet by mouth daily    ATORVASTATIN (LIPITOR) 80 MG TABLET    Take 1 tablet by mouth nightly    CLOPIDOGREL (PLAVIX) 75 MG TABLET    Take 1 tablet by mouth daily    LISINOPRIL (PRINIVIL;ZESTRIL) 20 MG TABLET    Take 1 tablet by mouth daily    METOPROLOL TARTRATE (LOPRESSOR) 25 MG TABLET    Take 1 tablet by mouth 2 times daily    OMEPRAZOLE (PRILOSEC) 40 MG DELAYED RELEASE CAPSULE    Take 40 mg by mouth daily       ALLERGIES     Patient has no known allergies. FAMILY HISTORY       Family History   Problem Relation Age of Onset    Heart Disease Father           SOCIAL HISTORY       Social History     Socioeconomic History    Marital status:      Spouse name: None    Number of children: None    Years of education: None    Highest education level: None   Occupational History    None   Tobacco Use    Smoking status: Former Smoker     Packs/day: 1.50     Years: 15.00     Pack years: 22.50     Types: Cigarettes     Quit date: 3/20/2006     Years since quittin.2    Smokeless tobacco: Never Used   Vaping Use    Vaping Use: Never used   Substance and Sexual Activity    Alcohol use: No    Drug use: Never    Sexual activity: None   Other Topics Concern    None   Social History Narrative    None     Social Determinants of Health     Financial Resource Strain:     Difficulty of Paying Living Expenses: Not on file   Food Insecurity:     Worried About Running Out of Food in the Last Year: Not on file    Vee of Food in the Last Year: Not on file   Transportation Needs:     Lack of Transportation (Medical): Not on file    Lack of Transportation (Non-Medical):  Not on file   Physical Activity:     Days of Exercise per Week: Not on file    Minutes of Exercise per Session: Not on file   Stress:     Feeling of Stress : Not on file   Social Connections:     Frequency of Communication with Friends and Family: Not on file    Frequency of Social Gatherings with Friends and Family: Not on file    Attends Voodoo Services: Not on file    Active Member of Clubs or Organizations: Not on file    Attends Club or Organization Meetings: Not on file    Marital Status: Not on file   Intimate Partner Violence:     Fear of Current or Ex-Partner: Not on file    Emotionally Abused: Not on file    Physically Abused: Not on file    Sexually Abused: Not on file   Housing Stability:     Unable to Pay for Housing in the Last Year: Not on file    Number of Places Lived in the Last Year: Not on file    Unstable Housing in the Last Year: Not on file         PHYSICAL EXAM    (up to 7 for level 4, 8 or more for level 5)     ED Triage Vitals [06/05/22 0714]   BP Temp Temp Source Heart Rate Resp SpO2 Height Weight   (!) 169/97 98.7 °F (37.1 °C) Oral 67 20 97 % 5' 10\" (1.778 m) (!) 315 lb (142.9 kg)       Physical Exam  Vitals and nursing note reviewed. Constitutional:       General: He is not in acute distress. Appearance: He is well-developed. HENT:      Head: Normocephalic and atraumatic. Right Ear: External ear normal.      Left Ear: External ear normal.   Eyes:      Conjunctiva/sclera: Conjunctivae normal.   Neck:      Vascular: No JVD. Trachea: No tracheal deviation. Cardiovascular:      Rate and Rhythm: Normal rate. Pulmonary:      Effort: Pulmonary effort is normal. No respiratory distress. Breath sounds: Normal breath sounds. No wheezing. Abdominal:      General: There is no distension. Palpations: Abdomen is soft. Tenderness: There is abdominal tenderness. There is no guarding or rebound. Comments: Mild diffuse abdominal tenderness with no rebound, guarding, peritoneal signs   Musculoskeletal:         General: No tenderness. Normal range of motion. Cervical back: Neck supple. Skin:     General: Skin is warm and dry. Neurological:      Mental Status: He is alert. Cranial Nerves: No cranial nerve deficit. DIAGNOSTIC RESULTS     EKG:All EKG's are interpreted by the Emergency Department Physician who either signs or Co-signs this chart in the absence of a cardiologist.    The Ekg interpreted by me shows  normal sinus rhythm with a rate of 65  Axis is   Normal  QTc is  411ms  Intervals and Durations are unremarkable.       ST Segments: nonspecific changes  Nonspecific ST changes possibly due to varying baseline from previous EKG on 4/29/2022    The Ekg interpreted by me shows  sinus bradycardia, rate=56   Axis is   Normal  QTc is  399ms  Intervals and Durations are unremarkable. ST Segments: no acute change  No significant change from prior EKG dated earlier today      RADIOLOGY:     Interpretation per the Radiologist below, if available at the time of this note:    CT ABDOMEN PELVIS W IV CONTRAST Additional Contrast? None   Final Result   Diverticulosis. XR CHEST PORTABLE   Final Result   No acute cardiopulmonary disease. ED BEDSIDE ULTRASOUND:   Performed by ED Physician - none    LABS:  Labs Reviewed   COMPREHENSIVE METABOLIC PANEL W/ REFLEX TO MG FOR LOW K - Abnormal; Notable for the following components:       Result Value    Sodium 134 (*)     Glucose 130 (*)     CREATININE 0.7 (*)     All other components within normal limits   CBC WITH AUTO DIFFERENTIAL   LIPASE   TROPONIN   TROPONIN       All otherlabs were within normal range or not returned as of this dictation. EMERGENCY DEPARTMENT COURSE and DIFFERENTIAL DIAGNOSIS/MDM:   Vitals:    Vitals:    06/05/22 0714 06/05/22 0902 06/05/22 1002   BP: (!) 169/97 (!) 155/84 (!) 153/91   Pulse: 67 57 58   Resp: 20 16 19   Temp: 98.7 °F (37.1 °C)     TempSrc: Oral     SpO2: 97% 97% 98%   Weight: (!) 315 lb (142.9 kg)     Height: 5' 10\" (1.778 m)           Is this patient to be included in the SEP-1 Core Measure due to severe sepsis or septic shock? No   Exclusion criteria - the patient is NOT to be included for SEP-1 Core Measure due to: Infection is not suspected      MDM  Patient's primary symptom is abdominal pain. Laboratory evaluation CT ab pelvis not show an acute emergent abdominal pathology. Have discussed diverticulosis without evidence of diverticulitis being evident on the CAT scan but not explaining the patient's symptoms. Patient does feel moderately better after IV fluids and Zofran.   As the patient does have a cardiac history serial EKGs and delta troponins were obtained despite the patient denying any chest pain and these are negative. Based on negative delta troponins and serial EKGs and patient denying any chest pain or shortness of breath I do feel he is appropriate for discharge with primary care follow-up and standard ER return precautions. Protonix prescribed. Patient expresses understanding and agreement with this plan and is discharged home. I estimate there is low risk for acute appendicitis, bowl obstruction, cholecystitis, diverticulitis, incarcerated hernia, mesenteric ischemia, pancreatitis, perforated bowl or ulcer, or abdominal aortic aneurism, thus I consider the discharge disposition reasonable. Also, there is no evidence or peritonitis, sepsis or toxicity. We have discussed the diagnosis and risks and agree with discharging home to follow-up with their primary doctor. We also discussed returning to the Emergency Department immediately if new or worsening symptoms occur and have discussed the symptoms which are most concerning (e.g., bloody stool, fever, changing or worsening pain, vomiting) that necessitate immediate return. Procedures    FINAL IMPRESSION      1. Abdominal pain, unspecified abdominal location    2. Diverticulosis          DISPOSITION/PLAN   DISPOSITION  Discharge      PATIENT REFERRED TO:  Donnie Best  [phone]  In 3 days  Ask for an appointment with a primary care doctor    [place] [facility]  [address]  [phone]    If symptoms worsen           (Please note that portions of this note were completed with a voice recognition program.  Efforts were made to edit the dictations but occasionally words aremis-transcribed. )    Alma Singh MD (electronically signed)  Attending Emergency Physician           Alma Singh MD  06/05/22 [phone]

## 2022-07-21 ENCOUNTER — APPOINTMENT (OUTPATIENT)
Dept: GENERAL RADIOLOGY | Age: 52
End: 2022-07-21
Payer: COMMERCIAL

## 2022-07-21 ENCOUNTER — HOSPITAL ENCOUNTER (EMERGENCY)
Age: 52
Discharge: HOME | End: 2022-07-21
Attending: STUDENT IN AN ORGANIZED HEALTH CARE EDUCATION/TRAINING PROGRAM
Payer: COMMERCIAL

## 2022-07-21 VITALS
DIASTOLIC BLOOD PRESSURE: 70 MMHG | SYSTOLIC BLOOD PRESSURE: 138 MMHG | BODY MASS INDEX: 41.52 KG/M2 | HEIGHT: 70 IN | HEART RATE: 65 BPM | OXYGEN SATURATION: 96 % | RESPIRATION RATE: 16 BRPM | WEIGHT: 290 LBS | TEMPERATURE: 97.4 F

## 2022-07-21 DIAGNOSIS — Z20.822 SUSPECTED COVID-19 VIRUS INFECTION: Primary | ICD-10-CM

## 2022-07-21 DIAGNOSIS — J18.9 PNEUMONIA OF LEFT LOWER LOBE DUE TO INFECTIOUS ORGANISM: ICD-10-CM

## 2022-07-21 DIAGNOSIS — I95.1 ORTHOSTATIC HYPOTENSION: ICD-10-CM

## 2022-07-21 LAB
A/G RATIO: 1.5 (ref 1.1–2.2)
ALBUMIN SERPL-MCNC: 4.7 G/DL (ref 3.4–5)
ALP BLD-CCNC: 158 U/L (ref 40–129)
ALT SERPL-CCNC: 38 U/L (ref 10–40)
ANION GAP SERPL CALCULATED.3IONS-SCNC: 15 MMOL/L (ref 3–16)
AST SERPL-CCNC: 93 U/L (ref 15–37)
BASOPHILS ABSOLUTE: 0.1 K/UL (ref 0–0.2)
BASOPHILS RELATIVE PERCENT: 0.5 %
BILIRUB SERPL-MCNC: 0.9 MG/DL (ref 0–1)
BUN BLDV-MCNC: 17 MG/DL (ref 7–20)
CALCIUM SERPL-MCNC: 9.6 MG/DL (ref 8.3–10.6)
CHLORIDE BLD-SCNC: 95 MMOL/L (ref 99–110)
CO2: 25 MMOL/L (ref 21–32)
CREAT SERPL-MCNC: 1.2 MG/DL (ref 0.9–1.3)
D DIMER: 0.42 UG/ML FEU (ref 0–0.6)
EOSINOPHILS ABSOLUTE: 0.2 K/UL (ref 0–0.6)
EOSINOPHILS RELATIVE PERCENT: 1.5 %
GFR AFRICAN AMERICAN: >60
GFR NON-AFRICAN AMERICAN: >60
GLUCOSE BLD-MCNC: 93 MG/DL (ref 70–99)
HCT VFR BLD CALC: 44.5 % (ref 40.5–52.5)
HEMOGLOBIN: 14.9 G/DL (ref 13.5–17.5)
INFLUENZA A: NOT DETECTED
INFLUENZA B: NOT DETECTED
LYMPHOCYTES ABSOLUTE: 1.6 K/UL (ref 1–5.1)
LYMPHOCYTES RELATIVE PERCENT: 14 %
MCH RBC QN AUTO: 29.6 PG (ref 26–34)
MCHC RBC AUTO-ENTMCNC: 33.4 G/DL (ref 31–36)
MCV RBC AUTO: 88.8 FL (ref 80–100)
MONOCYTES ABSOLUTE: 1.1 K/UL (ref 0–1.3)
MONOCYTES RELATIVE PERCENT: 9.8 %
NEUTROPHILS ABSOLUTE: 8.3 K/UL (ref 1.7–7.7)
NEUTROPHILS RELATIVE PERCENT: 74.2 %
PDW BLD-RTO: 14.2 % (ref 12.4–15.4)
PLATELET # BLD: 211 K/UL (ref 135–450)
PMV BLD AUTO: 8.9 FL (ref 5–10.5)
POTASSIUM SERPL-SCNC: 4.2 MMOL/L (ref 3.5–5.1)
PRO-BNP: 56 PG/ML (ref 0–124)
RBC # BLD: 5.01 M/UL (ref 4.2–5.9)
S PYO AG THROAT QL: NEGATIVE
SARS-COV-2 RNA, RT PCR: NOT DETECTED
SODIUM BLD-SCNC: 135 MMOL/L (ref 136–145)
TOTAL PROTEIN: 7.8 G/DL (ref 6.4–8.2)
TROPONIN: <0.01 NG/ML
WBC # BLD: 11.2 K/UL (ref 4–11)

## 2022-07-21 PROCEDURE — 87081 CULTURE SCREEN ONLY: CPT

## 2022-07-21 PROCEDURE — 36415 COLL VENOUS BLD VENIPUNCTURE: CPT

## 2022-07-21 PROCEDURE — 87636 SARSCOV2 & INF A&B AMP PRB: CPT

## 2022-07-21 PROCEDURE — 6370000000 HC RX 637 (ALT 250 FOR IP): Performed by: STUDENT IN AN ORGANIZED HEALTH CARE EDUCATION/TRAINING PROGRAM

## 2022-07-21 PROCEDURE — 84484 ASSAY OF TROPONIN QUANT: CPT

## 2022-07-21 PROCEDURE — 85379 FIBRIN DEGRADATION QUANT: CPT

## 2022-07-21 PROCEDURE — 87880 STREP A ASSAY W/OPTIC: CPT

## 2022-07-21 PROCEDURE — 93005 ELECTROCARDIOGRAM TRACING: CPT | Performed by: STUDENT IN AN ORGANIZED HEALTH CARE EDUCATION/TRAINING PROGRAM

## 2022-07-21 PROCEDURE — 80053 COMPREHEN METABOLIC PANEL: CPT

## 2022-07-21 PROCEDURE — 99285 EMERGENCY DEPT VISIT HI MDM: CPT

## 2022-07-21 PROCEDURE — 2580000003 HC RX 258: Performed by: STUDENT IN AN ORGANIZED HEALTH CARE EDUCATION/TRAINING PROGRAM

## 2022-07-21 PROCEDURE — 71045 X-RAY EXAM CHEST 1 VIEW: CPT

## 2022-07-21 PROCEDURE — 85025 COMPLETE CBC W/AUTO DIFF WBC: CPT

## 2022-07-21 PROCEDURE — 83880 ASSAY OF NATRIURETIC PEPTIDE: CPT

## 2022-07-21 RX ORDER — AMOXICILLIN 500 MG/1
1000 CAPSULE ORAL 3 TIMES DAILY
Qty: 30 CAPSULE | Refills: 0 | Status: SHIPPED | OUTPATIENT
Start: 2022-07-21 | End: 2022-07-26

## 2022-07-21 RX ORDER — AZITHROMYCIN 250 MG/1
TABLET, FILM COATED ORAL
Qty: 6 TABLET | Refills: 0 | Status: SHIPPED | OUTPATIENT
Start: 2022-07-21 | End: 2022-07-26

## 2022-07-21 RX ORDER — AZITHROMYCIN 250 MG/1
500 TABLET, FILM COATED ORAL ONCE
Status: COMPLETED | OUTPATIENT
Start: 2022-07-21 | End: 2022-07-21

## 2022-07-21 RX ORDER — AMOXICILLIN 500 MG/1
1000 CAPSULE ORAL ONCE
Status: COMPLETED | OUTPATIENT
Start: 2022-07-21 | End: 2022-07-21

## 2022-07-21 RX ORDER — ZINC SULFATE 50(220)MG
50 CAPSULE ORAL DAILY
Qty: 7 CAPSULE | Refills: 0 | Status: SHIPPED | OUTPATIENT
Start: 2022-07-21 | End: 2022-07-28

## 2022-07-21 RX ORDER — GUAIFENESIN 600 MG/1
600 TABLET, EXTENDED RELEASE ORAL 2 TIMES DAILY
Qty: 20 TABLET | Refills: 0 | Status: SHIPPED | OUTPATIENT
Start: 2022-07-21 | End: 2022-07-31

## 2022-07-21 RX ORDER — 0.9 % SODIUM CHLORIDE 0.9 %
1000 INTRAVENOUS SOLUTION INTRAVENOUS ONCE
Status: COMPLETED | OUTPATIENT
Start: 2022-07-21 | End: 2022-07-21

## 2022-07-21 RX ORDER — ASCORBIC ACID 500 MG
500 TABLET ORAL 2 TIMES DAILY
Qty: 14 TABLET | Refills: 0 | Status: SHIPPED | OUTPATIENT
Start: 2022-07-21 | End: 2022-07-28

## 2022-07-21 RX ADMIN — SODIUM CHLORIDE 1000 ML: 9 INJECTION, SOLUTION INTRAVENOUS at 19:50

## 2022-07-21 RX ADMIN — AMOXICILLIN 1000 MG: 500 CAPSULE ORAL at 22:31

## 2022-07-21 RX ADMIN — AZITHROMYCIN 500 MG: 250 TABLET, FILM COATED ORAL at 22:31

## 2022-07-21 ASSESSMENT — PAIN - FUNCTIONAL ASSESSMENT: PAIN_FUNCTIONAL_ASSESSMENT: NONE - DENIES PAIN

## 2022-07-21 ASSESSMENT — PAIN SCALES - GENERAL: PAINLEVEL_OUTOF10: 0

## 2022-07-21 NOTE — ED PROVIDER NOTES
[address]      CHIEF COMPLAINT  Concern for COVID      HISTORY OF PRESENT ILLNESS  Levi Mosqueda is a 49 y.o. male with a past medical history of CAD, HTN, PE, and HLD, who presents to the ED complaining of concern for COVID. Patient reports diarrhea and nausea without vomiting. He denies any abdominal pain. He does report some intermittent lightheadedness, worse with standing. He reports shortness of breath with exertion, lightheadedness and shortness of breath are not associated. He denies any chest pain. He does report mild nonproductive cough. He also reports mild intermittent sore throat. He does report significant fatigue. His significant other has COVID. Patient reports that given his history of cardiac disease he just wants to make sure there is nothing else going on. Old records reviewed:  Patient had NSTEMI in April    No other complaints, modifying factors or associated symptoms. I have reviewed the following from the nursing documentation. Past Medical History:   Diagnosis Date    CAD (coronary artery disease)     stent 2006 & 2008, balloon angioplasty in 2010, S/P NSTEMI 8/19/14 post CABG-->Cath patent grafts, s/p LCx ADELIA. H/O coronary artery bypass surgery     s/p CABG X2 LIMA-LAD, SVG-OM.  Echo 8/2014 LVEF 50-55%    History of tobacco use     quit smoking     HLD (hyperlipidemia)     Hypertension     Controlled    PE (pulmonary embolism)     and DVT history     Past Surgical History:   Procedure Laterality Date    CARPAL TUNNEL RELEASE      CORONARY ANGIOPLASTY WITH STENT PLACEMENT      CORONARY ARTERY BYPASS GRAFT  8-    CABG x 2    HX VASCULAR STENT       Family History   Problem Relation Age of Onset    Heart Disease Father      Social History     Socioeconomic History    Marital status:      Spouse name: Not on file    Number of children: Not on file    Years of education: Not on file    Highest education level: Not on file   Occupational History    Not on file   Tobacco Use    Smoking status: Former     Packs/day: 1.50     Years: 15.00     Pack years: 22.50     Types: Cigarettes     Quit date: 3/20/2006     Years since quittin.3    Smokeless tobacco: Never   Vaping Use    Vaping Use: Never used   Substance and Sexual Activity    Alcohol use: No    Drug use: Never    Sexual activity: Not on file   Other Topics Concern    Not on file   Social History Narrative    Not on file     Social Determinants of Health     Financial Resource Strain: Not on file   Food Insecurity: Not on file   Transportation Needs: Not on file   Physical Activity: Not on file   Stress: Not on file   Social Connections: Not on file   Intimate Partner Violence: Not on file   Housing Stability: Not on file     No current facility-administered medications for this encounter. Current Outpatient Medications   Medication Sig Dispense Refill    zinc sulfate (ZINCATE) 220 (50 Zn) MG capsule Take 1 capsule by mouth in the morning for 7 days. 7 capsule 0    ascorbic acid (VITAMIN C) 500 MG tablet Take 1 tablet by mouth in the morning and 1 tablet before bedtime. Do all this for 7 days. 14 tablet 0    guaiFENesin (MUCINEX) 600 MG extended release tablet Take 1 tablet by mouth in the morning and 1 tablet before bedtime. Do all this for 10 days. 20 tablet 0    amoxicillin (AMOXIL) 500 MG capsule Take 2 capsules by mouth in the morning and 2 capsules at noon and 2 capsules before bedtime. Do all this for 5 days. 30 capsule 0    azithromycin (ZITHROMAX) 250 MG tablet Take 2 tablets by mouth daily for 1 day, THEN 1 tablet daily for 4 days.  6 tablet 0    pantoprazole (PROTONIX) 40 MG tablet Take 1 tablet by mouth daily 30 tablet 0    aspirin 81 MG chewable tablet Take 1 tablet by mouth daily 30 tablet 3    metoprolol tartrate (LOPRESSOR) 25 MG tablet Take 1 tablet by mouth 2 times daily 60 tablet 1    atorvastatin (LIPITOR) 80 MG tablet Take 1 tablet by mouth nightly 30 tablet 3 clopidogrel (PLAVIX) 75 MG tablet Take 1 tablet by mouth daily 90 tablet 1    lisinopril (PRINIVIL;ZESTRIL) 20 MG tablet Take 1 tablet by mouth daily 90 tablet 3    omeprazole (PRILOSEC) 40 MG delayed release capsule Take 40 mg by mouth daily      albuterol sulfate HFA (VENTOLIN HFA) 108 (90 Base) MCG/ACT inhaler Inhale 2 puffs into the lungs 4 times daily as needed for Wheezing 18 g 0     No Known Allergies    REVIEW OF SYSTEMS  All systems reviewed, pertinent positives per HPI otherwise noted to be negative. PHYSICAL EXAM  /84   Pulse 84   Temp 97.4 °F (36.3 °C) (Oral)   Resp 20   Ht 5' 10\" (1.778 m)   Wt 290 lb (131.5 kg)   SpO2 96%   BMI 41.61 kg/m²    GENERAL APPEARANCE: Awake and alert. Cooperative. no distress. HENT: Normocephalic. Atraumatic. Mucous membranes are moist  NECK: Supple. Full range of motion of the neck without stiffness or pain. EYES: PERRL. EOM's grossly intact. HEART/CHEST: RRR. No murmurs. Chest wall is not tender to palpation. LUNGS: Respirations unlabored. CTAB. Good air exchange. Speaking comfortably in full sentences. ABDOMEN: No tenderness. Soft. Non-distended. No masses. No organomegaly. No guarding or rebound. MUSCULOSKELETAL: No extremity edema. Compartments soft. No deformity. No tenderness in the extremities. All extremities neurovascularly intact. SKIN: Warm and dry. No acute rashes. NEUROLOGICAL: Alert and oriented. CN's 2-12 intact. No gross facial drooping. Strength 5/5, sensation intact. PSYCHIATRIC: Normal mood and affect. LABS  I have reviewed all labs for this visit.    Results for orders placed or performed during the hospital encounter of 07/21/22   COVID-19 & Influenza Combo    Specimen: Nasopharyngeal Swab   Result Value Ref Range    SARS-CoV-2 RNA, RT PCR NOT DETECTED NOT DETECTED    INFLUENZA A NOT DETECTED NOT DETECTED    INFLUENZA B NOT DETECTED NOT DETECTED   Strep screen group a throat    Specimen: Throat   Result Value Ref Range    Rapid Strep A Screen Negative Negative   Culture, Beta Strep Confirm Plates    Specimen: Throat   Result Value Ref Range    Strep A Culture Further report to follow    CBC with Auto Differential   Result Value Ref Range    WBC 11.2 (H) 4.0 - 11.0 K/uL    RBC 5.01 4.20 - 5.90 M/uL    Hemoglobin 14.9 13.5 - 17.5 g/dL    Hematocrit 44.5 40.5 - 52.5 %    MCV 88.8 80.0 - 100.0 fL    MCH 29.6 26.0 - 34.0 pg    MCHC 33.4 31.0 - 36.0 g/dL    RDW 14.2 12.4 - 15.4 %    Platelets 837 274 - 435 K/uL    MPV 8.9 5.0 - 10.5 fL    Neutrophils % 74.2 %    Lymphocytes % 14.0 %    Monocytes % 9.8 %    Eosinophils % 1.5 %    Basophils % 0.5 %    Neutrophils Absolute 8.3 (H) 1.7 - 7.7 K/uL    Lymphocytes Absolute 1.6 1.0 - 5.1 K/uL    Monocytes Absolute 1.1 0.0 - 1.3 K/uL    Eosinophils Absolute 0.2 0.0 - 0.6 K/uL    Basophils Absolute 0.1 0.0 - 0.2 K/uL   CMP   Result Value Ref Range    Sodium 135 (L) 136 - 145 mmol/L    Potassium 4.2 3.5 - 5.1 mmol/L    Chloride 95 (L) 99 - 110 mmol/L    CO2 25 21 - 32 mmol/L    Anion Gap 15 3 - 16    Glucose 93 70 - 99 mg/dL    BUN 17 7 - 20 mg/dL    Creatinine 1.2 0.9 - 1.3 mg/dL    GFR Non-African American >60 >60    GFR African American >60 >60    Calcium 9.6 8.3 - 10.6 mg/dL    Total Protein 7.8 6.4 - 8.2 g/dL    Albumin 4.7 3.4 - 5.0 g/dL    Albumin/Globulin Ratio 1.5 1.1 - 2.2    Total Bilirubin 0.9 0.0 - 1.0 mg/dL    Alkaline Phosphatase 158 (H) 40 - 129 U/L    ALT 38 10 - 40 U/L    AST 93 (H) 15 - 37 U/L   Troponin   Result Value Ref Range    Troponin <0.01 <0.01 ng/mL   Brain Natriuretic Peptide   Result Value Ref Range    Pro-BNP 56 0 - 124 pg/mL   D-Dimer, Quantitative   Result Value Ref Range    D-Dimer, Quant 0.42 0.00 - 0.60 ug/mL FEU       ECG  The Ekg interpreted by me shows  normal sinus rhythm with a rate of 77  Axis is   Left axis deviation  QTc is  prolonged  Intervals and Durations are unremarkable.       ST Segments: nonspecific changes  No significant change from prior EKG dated 6/5/22    RADIOLOGY    XR CHEST PORTABLE   Final Result   Stable left ventricular enlargement      Questionable small infiltrate or atelectasis along the right lung base   laterally. Recommend a follow-up PA and lateral chest.                ED COURSE / MDM  Patient seen and evaluated. Old records reviewed and pertinent information included in HPI. Labs and imaging reviewed and results discussed with patient. Overall well appearing patient, in no acute distress, presenting for COVID-like symptoms. Physical exam unremarkable. Differential diagnosis includes but is not limited to: COVID, flu, viral illness, pneumonia, arrhythmia, ACS, orthostatic hypotension, vasovagal episodes, pulmonary embolism    EKG, laboratory studies, and imaging obtained. Workup showed: Workup showed:  ED Course as of 07/23/22 1458   Thu Jul 21, 2022 1941 Orthostatic vital signs positive [ER]   1947 The Ekg interpreted by me shows  normal sinus rhythm with a rate of 77  Axis is   Left axis deviation  QTc is  prolonged  Intervals and Durations are unremarkable. ST Segments: nonspecific changes  No significant change from prior EKG dated 6/5/22 [ER]   2022 Strep swab negative [ER]   2022 Mild leukocytosis to 11.2. No anemia or thrombocytopenia [ER]   2022 COVID and flu swab negative. However, the patient lives with somebody who is COVID-positive. Patient's symptoms are consistent with COVID. [ER]   2039 CXR: IMPRESSION:  Stable left ventricular enlargement     Questionable small infiltrate or atelectasis along the right lung base laterally. Recommend a follow-up PA and lateral chest. [ER]   2107 EKG without evidence of acute ischemia. Troponin within normal limits. [ER]   2108 BNP within normal limits, no evidence of fluid overload on exam [ER]   2108 Mild hyponatremia 135, hypochloremia 95.   No other electrolyte abnormalities or evidence of kidney dysfunction [ER]   2108 Liver function testing shows mildly elevated alkaline phosphatase and AST, otherwise unremarkable. Patient does not have right upper quadrant. Low suspicion for acute liver pathology. [ER]   2147 D-dimer is within normal limits,  Patient denies chest pain. No evidence of DVT on exam.  Overall low suspicion for pulmonary embolism. [ER]      ED Course User Index  [ER] Noris Barajas MD      Is this patient to be included in the SEP-1 Core Measure due to severe sepsis or septic shock? No   Exclusion criteria - the patient is NOT to be included for SEP-1 Core Measure due to:  2+ SIRS criteria are not met    During the patient's ED course, the patient was given:  Medications   0.9 % sodium chloride bolus (0 mLs IntraVENous Stopped 7/21/22 2108)   amoxicillin (AMOXIL) capsule 1,000 mg (1,000 mg Oral Given 7/21/22 2231)   azithromycin (ZITHROMAX) tablet 500 mg (500 mg Oral Given 7/21/22 2231)      Patient reports significant improvement in overall feeling after fluids in the emergency department. Discussed results of work-up, discussed that despite negative COVID test it is possible that he could have COVID. The patient was specifically advised their symptoms are consistent with possible COVID-19 infection, and they need to self-isolate at home and restrict any activities outside of their home except for seeking medical care, and to wear a facemask whenever around others. The patient was provided specific instructions related to COVID-19, along with recommendations for proper respiratory hygiene and instructions on prevention of transmission of infection to others. The patient was counseled to closely monitor their symptoms, and to return immediately to the Emergency Department for any difficulty breathing, confusion, dizziness or passing out, vomiting, chest pain, high fevers, weakness, or any other new or concerning symptoms.   There is no evidence of emergent medical condition at this time, and the patient will be discharged in good condition. Chest x-ray did show evidence of possible pneumonia, given negative COVID swab it is possible that patient could have bacterial pneumonia. Will treat with antibiotics. Patient did have orthostatic hypotension, received fluids in the emergency department. Work-up overall reassuring. At this time, feel the patient is appropriate for discharge to follow-up with a primary care doctor. Patient feels comfortable with discharge at this time. Patient was provided with prescriptions for zinc, vitamin C, Mucinex, amoxicillin, and azithromycin. Strict return precautions given. Encouraged PCP follow-up in 2 days. Patient discharged in stable condition. CLINICAL IMPRESSION  1. Suspected COVID-19 virus infection    2. Orthostatic hypotension    3. Pneumonia of left lower lobe due to infectious organism        Blood pressure 138/70, pulse 65, temperature 97.4 °F (36.3 °C), temperature source Oral, resp. rate 16, height 5' 10\" (1.778 m), weight 290 lb (131.5 kg), SpO2 96 %. DISPOSITION  Stanford Velasco was discharged to home in stable condition. Patient was given scripts for the following medications. I counseled patient how to take these medications. Discharge Medication List as of 7/21/2022 11:04 PM        START taking these medications    Details   zinc sulfate (ZINCATE) 220 (50 Zn) MG capsule Take 1 capsule by mouth in the morning for 7 days. , Disp-7 capsule, R-0Normal      ascorbic acid (VITAMIN C) 500 MG tablet Take 1 tablet by mouth in the morning and 1 tablet before bedtime. Do all this for 7 days. , Disp-14 tablet, R-0Normal      guaiFENesin (MUCINEX) 600 MG extended release tablet Take 1 tablet by mouth in the morning and 1 tablet before bedtime. Do all this for 10 days. , Disp-20 tablet, R-0Normal      amoxicillin (AMOXIL) 500 MG capsule Take 2 capsules by mouth in the morning and 2 capsules at noon and 2 capsules before bedtime. Do all this for 5 days. , Disp-30 capsule, R-0Normal      azithromycin (ZITHROMAX) 250 MG tablet Take 2 tablets by mouth daily for 1 day, THEN 1 tablet daily for 4 days. , Disp-6 tablet, R-0Normal             Follow-up with:  Donnie Best  [phone]  Schedule an appointment as soon as possible for a visit       [place] [facility]  [address] [facility]  [phone]  Go to   As needed, If symptoms worsen    DISCLAIMER: This chart was created using Dragon dictation software. Efforts were made by me to ensure accuracy, however some errors may be present due to limitations of this technology and occasionally words are not transcribed correctly.           Noris Barajas MD  07/23/22 7180

## 2022-07-22 LAB
EKG ATRIAL RATE: 77 BPM
EKG DIAGNOSIS: NORMAL
EKG P AXIS: 17 DEGREES
EKG P-R INTERVAL: 162 MS
EKG Q-T INTERVAL: 406 MS
EKG QRS DURATION: 114 MS
EKG QTC CALCULATION (BAZETT): 459 MS
EKG R AXIS: -54 DEGREES
EKG T AXIS: 92 DEGREES
EKG VENTRICULAR RATE: 77 BPM

## 2022-07-22 PROCEDURE — 93010 ELECTROCARDIOGRAM REPORT: CPT | Performed by: INTERNAL MEDICINE

## 2022-07-22 NOTE — DISCHARGE INSTRUCTIONS
Your COVID swab today was negative, however given that you live with a COVID positive person, it is possible you have COVID.   We do recommend that you try to socially isolate

## 2022-07-23 LAB — S PYO THROAT QL CULT: NORMAL

## 2022-07-24 ENCOUNTER — HOSPITAL ENCOUNTER (EMERGENCY)
Age: 52
Discharge: HOME | End: 2022-07-24
Attending: STUDENT IN AN ORGANIZED HEALTH CARE EDUCATION/TRAINING PROGRAM
Payer: COMMERCIAL

## 2022-07-24 ENCOUNTER — APPOINTMENT (OUTPATIENT)
Dept: GENERAL RADIOLOGY | Age: 52
End: 2022-07-24
Payer: COMMERCIAL

## 2022-07-24 VITALS
OXYGEN SATURATION: 96 % | TEMPERATURE: 98.5 F | WEIGHT: 290 LBS | RESPIRATION RATE: 16 BRPM | SYSTOLIC BLOOD PRESSURE: 141 MMHG | HEIGHT: 70 IN | HEART RATE: 51 BPM | DIASTOLIC BLOOD PRESSURE: 67 MMHG | BODY MASS INDEX: 41.52 KG/M2

## 2022-07-24 DIAGNOSIS — F41.1 ANXIETY STATE: Primary | ICD-10-CM

## 2022-07-24 DIAGNOSIS — R42 LIGHTHEADEDNESS: ICD-10-CM

## 2022-07-24 LAB
A/G RATIO: 1.5 (ref 1.1–2.2)
ALBUMIN SERPL-MCNC: 4 G/DL (ref 3.4–5)
ALP BLD-CCNC: 126 U/L (ref 40–129)
ALT SERPL-CCNC: 30 U/L (ref 10–40)
ANION GAP SERPL CALCULATED.3IONS-SCNC: 8 MMOL/L (ref 3–16)
AST SERPL-CCNC: 33 U/L (ref 15–37)
BACTERIA: ABNORMAL /HPF
BASOPHILS ABSOLUTE: 0.1 K/UL (ref 0–0.2)
BASOPHILS RELATIVE PERCENT: 1.2 %
BILIRUB SERPL-MCNC: 0.6 MG/DL (ref 0–1)
BILIRUBIN URINE: NEGATIVE
BLOOD, URINE: ABNORMAL
BUN BLDV-MCNC: 10 MG/DL (ref 7–20)
CALCIUM SERPL-MCNC: 9.1 MG/DL (ref 8.3–10.6)
CHLORIDE BLD-SCNC: 100 MMOL/L (ref 99–110)
CLARITY: CLEAR
CO2: 28 MMOL/L (ref 21–32)
COLOR: YELLOW
CREAT SERPL-MCNC: 0.8 MG/DL (ref 0.9–1.3)
EKG ATRIAL RATE: 49 BPM
EKG DIAGNOSIS: NORMAL
EKG P AXIS: 28 DEGREES
EKG P-R INTERVAL: 192 MS
EKG Q-T INTERVAL: 444 MS
EKG QRS DURATION: 118 MS
EKG QTC CALCULATION (BAZETT): 401 MS
EKG R AXIS: -43 DEGREES
EKG T AXIS: 69 DEGREES
EKG VENTRICULAR RATE: 49 BPM
EOSINOPHILS ABSOLUTE: 0.3 K/UL (ref 0–0.6)
EOSINOPHILS RELATIVE PERCENT: 6 %
EPITHELIAL CELLS, UA: ABNORMAL /HPF (ref 0–5)
GFR AFRICAN AMERICAN: >60
GFR NON-AFRICAN AMERICAN: >60
GLUCOSE BLD-MCNC: 126 MG/DL (ref 70–99)
GLUCOSE URINE: NEGATIVE MG/DL
HCT VFR BLD CALC: 41.2 % (ref 40.5–52.5)
HEMOGLOBIN: 13.9 G/DL (ref 13.5–17.5)
INFLUENZA A: NOT DETECTED
INFLUENZA B: NOT DETECTED
KETONES, URINE: NEGATIVE MG/DL
LEUKOCYTE ESTERASE, URINE: NEGATIVE
LYMPHOCYTES ABSOLUTE: 1.3 K/UL (ref 1–5.1)
LYMPHOCYTES RELATIVE PERCENT: 28 %
MCH RBC QN AUTO: 30.3 PG (ref 26–34)
MCHC RBC AUTO-ENTMCNC: 33.6 G/DL (ref 31–36)
MCV RBC AUTO: 90.2 FL (ref 80–100)
MICROSCOPIC EXAMINATION: YES
MONOCYTES ABSOLUTE: 0.4 K/UL (ref 0–1.3)
MONOCYTES RELATIVE PERCENT: 9.2 %
MUCUS: ABNORMAL /LPF
NEUTROPHILS ABSOLUTE: 2.5 K/UL (ref 1.7–7.7)
NEUTROPHILS RELATIVE PERCENT: 55.6 %
NITRITE, URINE: NEGATIVE
PDW BLD-RTO: 14.1 % (ref 12.4–15.4)
PH UA: 6 (ref 5–8)
PLATELET # BLD: 164 K/UL (ref 135–450)
PMV BLD AUTO: 8.8 FL (ref 5–10.5)
POTASSIUM REFLEX MAGNESIUM: 4 MMOL/L (ref 3.5–5.1)
PROTEIN UA: NEGATIVE MG/DL
RBC # BLD: 4.57 M/UL (ref 4.2–5.9)
RBC UA: ABNORMAL /HPF (ref 0–4)
SARS-COV-2 RNA, RT PCR: NOT DETECTED
SODIUM BLD-SCNC: 136 MMOL/L (ref 136–145)
SPECIFIC GRAVITY UA: 1.01 (ref 1–1.03)
TOTAL PROTEIN: 6.6 G/DL (ref 6.4–8.2)
TROPONIN: <0.01 NG/ML
TROPONIN: <0.01 NG/ML
URINE TYPE: ABNORMAL
UROBILINOGEN, URINE: 0.2 E.U./DL
WBC # BLD: 4.5 K/UL (ref 4–11)
WBC UA: ABNORMAL /HPF (ref 0–5)

## 2022-07-24 PROCEDURE — 85025 COMPLETE CBC W/AUTO DIFF WBC: CPT

## 2022-07-24 PROCEDURE — 99285 EMERGENCY DEPT VISIT HI MDM: CPT

## 2022-07-24 PROCEDURE — 87636 SARSCOV2 & INF A&B AMP PRB: CPT

## 2022-07-24 PROCEDURE — 93005 ELECTROCARDIOGRAM TRACING: CPT | Performed by: STUDENT IN AN ORGANIZED HEALTH CARE EDUCATION/TRAINING PROGRAM

## 2022-07-24 PROCEDURE — 93010 ELECTROCARDIOGRAM REPORT: CPT | Performed by: INTERNAL MEDICINE

## 2022-07-24 PROCEDURE — 36415 COLL VENOUS BLD VENIPUNCTURE: CPT

## 2022-07-24 PROCEDURE — 71046 X-RAY EXAM CHEST 2 VIEWS: CPT

## 2022-07-24 PROCEDURE — 81001 URINALYSIS AUTO W/SCOPE: CPT

## 2022-07-24 PROCEDURE — 84484 ASSAY OF TROPONIN QUANT: CPT

## 2022-07-24 PROCEDURE — 80053 COMPREHEN METABOLIC PANEL: CPT

## 2022-07-24 PROCEDURE — 2580000003 HC RX 258: Performed by: STUDENT IN AN ORGANIZED HEALTH CARE EDUCATION/TRAINING PROGRAM

## 2022-07-24 RX ORDER — 0.9 % SODIUM CHLORIDE 0.9 %
1000 INTRAVENOUS SOLUTION INTRAVENOUS ONCE
Status: COMPLETED | OUTPATIENT
Start: 2022-07-24 | End: 2022-07-24

## 2022-07-24 RX ADMIN — SODIUM CHLORIDE 1000 ML: 9 INJECTION, SOLUTION INTRAVENOUS at 11:54

## 2022-07-24 ASSESSMENT — PAIN - FUNCTIONAL ASSESSMENT: PAIN_FUNCTIONAL_ASSESSMENT: NONE - DENIES PAIN

## 2022-07-24 NOTE — ED NOTES
Walk test completed.  96-97% with heart rate 102 [facility] Jim, RN  07/24/22 5901

## 2022-07-24 NOTE — ED PROVIDER NOTES
[address] ED      CHIEF COMPLAINT  Fatigue (Reports being here on Thursday was told he had covid pneumonia. Reports feeling faint.)     HISTORY OF PRESENT ILLNESS  Harsh Cuello is a 49 y.o. male  who presents to the ED complaining of 1 episode of lightheadedness. Patient states that he was at home earlier he became very lightheaded and thought he might faint. He is currently back to normal.  States that he was having a tingling sensation in his legs at this point in time. He denies any associated chest pain. Was recently diagnosed with pneumonia and has been taking his antibiotics. He also states that he has been under a large amount of stress at home and significant other might have stage IV breast cancer and that he believes that he may have had a panic attack. He is currently asymptomatic. No other complaints, modifying factors or associated symptoms. I have reviewed the following from the nursing documentation. Past Medical History:   Diagnosis Date    CAD (coronary artery disease)     stent 2006 & 2008, balloon angioplasty in 2010, S/P NSTEMI 8/19/14 post CABG-->Cath patent grafts, s/p LCx ADELIA. H/O coronary artery bypass surgery     s/p CABG X2 LIMA-LAD, SVG-OM.  Echo 8/2014 LVEF 50-55%    History of tobacco use     quit smoking     HLD (hyperlipidemia)     Hypertension     Controlled    PE (pulmonary embolism)     and DVT history     Past Surgical History:   Procedure Laterality Date    CARPAL TUNNEL RELEASE      CORONARY ANGIOPLASTY WITH STENT PLACEMENT      CORONARY ARTERY BYPASS GRAFT  8-    CABG x 2    HX VASCULAR STENT       Family History   Problem Relation Age of Onset    Heart Disease Father      Social History     Socioeconomic History    Marital status:      Spouse name: Not on file    Number of children: Not on file    Years of education: Not on file    Highest education level: Not on file   Occupational History    Not on file   Tobacco Use Smoking status: Former     Packs/day: 1.50     Years: 15.00     Pack years: 22.50     Types: Cigarettes     Quit date: 3/20/2006     Years since quittin.3    Smokeless tobacco: Never   Vaping Use    Vaping Use: Never used   Substance and Sexual Activity    Alcohol use: No    Drug use: Never    Sexual activity: Not on file   Other Topics Concern    Not on file   Social History Narrative    Not on file     Social Determinants of Health     Financial Resource Strain: Not on file   Food Insecurity: Not on file   Transportation Needs: Not on file   Physical Activity: Not on file   Stress: Not on file   Social Connections: Not on file   Intimate Partner Violence: Not on file   Housing Stability: Not on file     No current facility-administered medications for this encounter. Current Outpatient Medications   Medication Sig Dispense Refill    zinc sulfate (ZINCATE) 220 (50 Zn) MG capsule Take 1 capsule by mouth in the morning for 7 days. 7 capsule 0    ascorbic acid (VITAMIN C) 500 MG tablet Take 1 tablet by mouth in the morning and 1 tablet before bedtime. Do all this for 7 days. 14 tablet 0    guaiFENesin (MUCINEX) 600 MG extended release tablet Take 1 tablet by mouth in the morning and 1 tablet before bedtime. Do all this for 10 days. 20 tablet 0    amoxicillin (AMOXIL) 500 MG capsule Take 2 capsules by mouth in the morning and 2 capsules at noon and 2 capsules before bedtime. Do all this for 5 days. 30 capsule 0    azithromycin (ZITHROMAX) 250 MG tablet Take 2 tablets by mouth daily for 1 day, THEN 1 tablet daily for 4 days.  6 tablet 0    pantoprazole (PROTONIX) 40 MG tablet Take 1 tablet by mouth daily 30 tablet 0    aspirin 81 MG chewable tablet Take 1 tablet by mouth daily 30 tablet 3    metoprolol tartrate (LOPRESSOR) 25 MG tablet Take 1 tablet by mouth 2 times daily 60 tablet 1    atorvastatin (LIPITOR) 80 MG tablet Take 1 tablet by mouth nightly 30 tablet 3    clopidogrel (PLAVIX) 75 MG tablet Take 1 tablet by mouth daily 90 tablet 1    lisinopril (PRINIVIL;ZESTRIL) 20 MG tablet Take 1 tablet by mouth daily 90 tablet 3    omeprazole (PRILOSEC) 40 MG delayed release capsule Take 40 mg by mouth daily      albuterol sulfate HFA (VENTOLIN HFA) 108 (90 Base) MCG/ACT inhaler Inhale 2 puffs into the lungs 4 times daily as needed for Wheezing 18 g 0     No Known Allergies    REVIEW OF SYSTEMS  10 systems reviewed, pertinent positives per HPI otherwise noted to be negative. PHYSICAL EXAM  BP (!) 141/67   Pulse 51   Temp 98.5 °F (36.9 °C) (Oral)   Resp 16   Ht 5' 10\" (1.778 m)   Wt 290 lb (131.5 kg)   SpO2 96%   BMI 41.61 kg/m²    GENERAL APPEARANCE: Awake and alert. Cooperative. No acute distress. HENT: Normocephalic. Atraumatic. NECK: Supple. EYES: PERRL. EOM's grossly intact. HEART/CHEST: RRR. No murmurs. LUNGS: Respirations unlabored. CTAB. Good air exchange. Speaking comfortably in full sentences. ABDOMEN: No tenderness. Soft. Non-distended. No masses. No organomegaly. No guarding or rebound. MUSCULOSKELETAL: No extremity edema. Compartments soft. No deformity. No tenderness in the extremities. All extremities neurovascularly intact. SKIN: Warm and dry. No acute rashes. NEUROLOGICAL: Alert and oriented. CN's 2-12 intact. No gross facial drooping. Strength 5/5, sensation intact. Gait normal.  PSYCHIATRIC: Normal mood and affect. LABS  I have reviewed all labs for this visit.    Results for orders placed or performed during the hospital encounter of 07/24/22   COVID-19 & Influenza Combo    Specimen: Nasopharyngeal Swab   Result Value Ref Range    SARS-CoV-2 RNA, RT PCR NOT DETECTED NOT DETECTED    INFLUENZA A NOT DETECTED NOT DETECTED    INFLUENZA B NOT DETECTED NOT DETECTED   CBC with Auto Differential   Result Value Ref Range    WBC 4.5 4.0 - 11.0 K/uL    RBC 4.57 4.20 - 5.90 M/uL    Hemoglobin 13.9 13.5 - 17.5 g/dL    Hematocrit 41.2 40.5 - 52.5 %    MCV 90.2 80.0 - 100.0 fL    MCH 30.3 26.0 - 34.0 pg    MCHC 33.6 31.0 - 36.0 g/dL    RDW 14.1 12.4 - 15.4 %    Platelets 944 427 - 820 K/uL    MPV 8.8 5.0 - 10.5 fL    Neutrophils % 55.6 %    Lymphocytes % 28.0 %    Monocytes % 9.2 %    Eosinophils % 6.0 %    Basophils % 1.2 %    Neutrophils Absolute 2.5 1.7 - 7.7 K/uL    Lymphocytes Absolute 1.3 1.0 - 5.1 K/uL    Monocytes Absolute 0.4 0.0 - 1.3 K/uL    Eosinophils Absolute 0.3 0.0 - 0.6 K/uL    Basophils Absolute 0.1 0.0 - 0.2 K/uL   Comprehensive Metabolic Panel w/ Reflex to MG   Result Value Ref Range    Sodium 136 136 - 145 mmol/L    Potassium reflex Magnesium 4.0 3.5 - 5.1 mmol/L    Chloride 100 99 - 110 mmol/L    CO2 28 21 - 32 mmol/L    Anion Gap 8 3 - 16    Glucose 126 (H) 70 - 99 mg/dL    BUN 10 7 - 20 mg/dL    Creatinine 0.8 (L) 0.9 - 1.3 mg/dL    GFR Non-African American >60 >60    GFR African American >60 >60    Calcium 9.1 8.3 - 10.6 mg/dL    Total Protein 6.6 6.4 - 8.2 g/dL    Albumin 4.0 3.4 - 5.0 g/dL    Albumin/Globulin Ratio 1.5 1.1 - 2.2    Total Bilirubin 0.6 0.0 - 1.0 mg/dL    Alkaline Phosphatase 126 40 - 129 U/L    ALT 30 10 - 40 U/L    AST 33 15 - 37 U/L   Urinalysis with Microscopic   Result Value Ref Range    Color, UA Yellow Straw/Yellow    Clarity, UA Clear Clear    Glucose, Ur Negative Negative mg/dL    Bilirubin Urine Negative Negative    Ketones, Urine Negative Negative mg/dL    Specific Gravity, UA 1.010 1.005 - 1.030    Blood, Urine TRACE-INTACT (A) Negative    pH, UA 6.0 5.0 - 8.0    Protein, UA Negative Negative mg/dL    Urobilinogen, Urine 0.2 <2.0 E.U./dL    Nitrite, Urine Negative Negative    Leukocyte Esterase, Urine Negative Negative    Microscopic Examination YES     Urine Type NotGiven     Mucus, UA Rare (A) None Seen /LPF    WBC, UA 0-2 0 - 5 /HPF    RBC, UA 3-4 0 - 4 /HPF    Epithelial Cells, UA 0-1 0 - 5 /HPF    Bacteria, UA Rare (A) None Seen /HPF   Troponin   Result Value Ref Range    Troponin <0.01 <0.01 ng/mL   Troponin   Result Value Ref Range otherwise medically stable for discharge home. He is given return precautions for the ED and is advised to follow-up with his PCP. He is comfortable in agreement with plan of care. I, Dr. Jeremias Prajapati MD, am the primary clinician of record. Is this patient to be included in the SEP-1 Core Measure? No   Exclusion criteria - the patient is NOT to be included for SEP-1 Core Measure due to:  2+ SIRS criteria are not met     During the patient's ED course, the patient was given:  Medications   0.9 % sodium chloride bolus (0 mLs IntraVENous Stopped 7/24/22 1335)        CLINICAL IMPRESSION  1. Anxiety state    2. Lightheadedness        Blood pressure (!) 141/67, pulse 51, temperature 98.5 °F (36.9 °C), temperature source Oral, resp. rate 16, height 5' 10\" (1.778 m), weight 290 lb (131.5 kg), SpO2 96 %. DISPOSITION  Adelina Babcock was discharged to home in good condition. Patient was given scripts for the following medications. I counseled patient how to take these medications. New Prescriptions    No medications on file       [facility]:  [place] [facility]  [address] [facility]  [phone]  Go to   If symptoms worsen    DISCLAIMER: This chart was created using Dragon dictation software. Efforts were made by me to ensure accuracy, however some errors may be present due to limitations of this technology and occasionally words are not transcribed correctly.        Jeremias Prajapati MD  07/24/22 0252

## 2022-07-24 NOTE — DISCHARGE INSTRUCTIONS
Outpatient [address]  Location: [address] [facility], [facility] [address]  Phone: [phone]    Location: [address] [facility], [facility] [address]  Phone: [phone]    Location: [address] [place] [address], [address]  Phone: [phone]

## 2022-11-23 NOTE — ED NOTES
2232-Call to the [facility] to have a transfer case started     Siomara [facility]  01/22/22 [id number] cardiovascular/musculoskeletal